# Patient Record
Sex: FEMALE | Race: WHITE | Employment: UNEMPLOYED | URBAN - METROPOLITAN AREA
[De-identification: names, ages, dates, MRNs, and addresses within clinical notes are randomized per-mention and may not be internally consistent; named-entity substitution may affect disease eponyms.]

---

## 2017-08-16 ENCOUNTER — HOSPITAL ENCOUNTER (OUTPATIENT)
Dept: MAMMOGRAPHY | Age: 52
Discharge: HOME | End: 2017-08-16
Attending: FAMILY MEDICINE

## 2017-08-16 DIAGNOSIS — Z12.39 SCREENING FOR MALIGNANT NEOPLASM OF BREAST: ICD-10-CM

## 2017-08-24 ENCOUNTER — HOSPITAL ENCOUNTER (OUTPATIENT)
Dept: SLEEP MEDICINE | Age: 52
Discharge: HOME | End: 2017-08-24
Payer: MEDICARE

## 2017-08-24 PROCEDURE — 95810 POLYSOM 6/> YRS 4/> PARAM: CPT

## 2018-08-15 ENCOUNTER — HOSPITAL ENCOUNTER (OUTPATIENT)
Dept: MAMMOGRAPHY | Age: 53
Discharge: HOME | End: 2018-08-15
Attending: FAMILY MEDICINE
Payer: MEDICARE

## 2018-08-15 DIAGNOSIS — Z12.39 ENCOUNTER FOR SCREENING FOR MALIGNANT NEOPLASM OF BREAST: ICD-10-CM

## 2018-08-15 PROCEDURE — 77067 SCR MAMMO BI INCL CAD: CPT

## 2021-06-02 ENCOUNTER — ANESTHESIA EVENT (OUTPATIENT)
Dept: SURGERY | Age: 56
End: 2021-06-02
Payer: MEDICARE

## 2021-06-03 ENCOUNTER — ANESTHESIA (OUTPATIENT)
Dept: SURGERY | Age: 56
End: 2021-06-03
Payer: MEDICARE

## 2021-06-03 ENCOUNTER — HOSPITAL ENCOUNTER (OUTPATIENT)
Age: 56
Setting detail: OUTPATIENT SURGERY
Discharge: HOME | End: 2021-06-03
Attending: ORTHOPAEDIC SURGERY | Admitting: ORTHOPAEDIC SURGERY
Payer: MEDICARE

## 2021-06-03 ENCOUNTER — APPOINTMENT (OUTPATIENT)
Dept: GENERAL RADIOLOGY | Age: 56
End: 2021-06-03
Attending: ORTHOPAEDIC SURGERY
Payer: MEDICARE

## 2021-06-03 VITALS
HEART RATE: 72 BPM | WEIGHT: 155 LBS | SYSTOLIC BLOOD PRESSURE: 111 MMHG | OXYGEN SATURATION: 95 % | DIASTOLIC BLOOD PRESSURE: 58 MMHG | TEMPERATURE: 98.2 F | BODY MASS INDEX: 26.61 KG/M2 | RESPIRATION RATE: 16 BRPM

## 2021-06-03 DIAGNOSIS — G89.18 ACUTE POST-OPERATIVE PAIN: ICD-10-CM

## 2021-06-03 DIAGNOSIS — M21.611 BILATERAL BUNIONS: Primary | ICD-10-CM

## 2021-06-03 DIAGNOSIS — M21.612 BILATERAL BUNIONS: Primary | ICD-10-CM

## 2021-06-03 LAB — POTASSIUM BLD-SCNC: 3.6 MMOL/L (ref 3.5–5.1)

## 2021-06-03 PROCEDURE — 28308 INCISION OF METATARSAL: CPT | Performed by: NURSE PRACTITIONER

## 2021-06-03 PROCEDURE — 76010000149 HC OR TIME 1 TO 1.5 HR: Performed by: ORTHOPAEDIC SURGERY

## 2021-06-03 PROCEDURE — 77030002933 HC SUT MCRYL J&J -A: Performed by: ORTHOPAEDIC SURGERY

## 2021-06-03 PROCEDURE — 76210000020 HC REC RM PH II FIRST 0.5 HR: Performed by: ORTHOPAEDIC SURGERY

## 2021-06-03 PROCEDURE — 74011250636 HC RX REV CODE- 250/636: Performed by: ANESTHESIOLOGY

## 2021-06-03 PROCEDURE — C1713 ANCHOR/SCREW BN/BN,TIS/BN: HCPCS | Performed by: ORTHOPAEDIC SURGERY

## 2021-06-03 PROCEDURE — 77030040573 HC BUR PROSTEP MICA WRGH -C: Performed by: ORTHOPAEDIC SURGERY

## 2021-06-03 PROCEDURE — 77030019908 HC STETH ESOPH SIMS -A: Performed by: NURSE ANESTHETIST, CERTIFIED REGISTERED

## 2021-06-03 PROCEDURE — 84132 ASSAY OF SERUM POTASSIUM: CPT

## 2021-06-03 PROCEDURE — 28299 COR HLX VLGS DOUBLE OSTEOT: CPT | Performed by: NURSE PRACTITIONER

## 2021-06-03 PROCEDURE — 74011250637 HC RX REV CODE- 250/637: Performed by: ANESTHESIOLOGY

## 2021-06-03 PROCEDURE — 77030040571: Performed by: ORTHOPAEDIC SURGERY

## 2021-06-03 PROCEDURE — 2709999900 HC NON-CHARGEABLE SUPPLY: Performed by: ORTHOPAEDIC SURGERY

## 2021-06-03 PROCEDURE — 77030008845 HC WRE K STRY -B: Performed by: ORTHOPAEDIC SURGERY

## 2021-06-03 PROCEDURE — 77030040572 HC DRVR HEX PROSTEP MICA WRGH -C: Performed by: ORTHOPAEDIC SURGERY

## 2021-06-03 PROCEDURE — 76060000033 HC ANESTHESIA 1 TO 1.5 HR: Performed by: ORTHOPAEDIC SURGERY

## 2021-06-03 PROCEDURE — 77030010509 HC AIRWY LMA MSK TELE -A: Performed by: NURSE ANESTHETIST, CERTIFIED REGISTERED

## 2021-06-03 PROCEDURE — 77030002982 HC SUT POLYSRB J&J -A: Performed by: ORTHOPAEDIC SURGERY

## 2021-06-03 PROCEDURE — 77030000032 HC CUF TRNQT ZIMM -B: Performed by: ORTHOPAEDIC SURGERY

## 2021-06-03 PROCEDURE — 74011250636 HC RX REV CODE- 250/636: Performed by: NURSE ANESTHETIST, CERTIFIED REGISTERED

## 2021-06-03 PROCEDURE — 77030006788 HC BLD SAW OSC STRY -B: Performed by: ORTHOPAEDIC SURGERY

## 2021-06-03 PROCEDURE — 74011250636 HC RX REV CODE- 250/636: Performed by: NURSE PRACTITIONER

## 2021-06-03 PROCEDURE — 74011000250 HC RX REV CODE- 250: Performed by: NURSE ANESTHETIST, CERTIFIED REGISTERED

## 2021-06-03 PROCEDURE — 74011000250 HC RX REV CODE- 250: Performed by: ORTHOPAEDIC SURGERY

## 2021-06-03 PROCEDURE — 28299 COR HLX VLGS DOUBLE OSTEOT: CPT | Performed by: ORTHOPAEDIC SURGERY

## 2021-06-03 PROCEDURE — 28285 REPAIR OF HAMMERTOE: CPT | Performed by: ORTHOPAEDIC SURGERY

## 2021-06-03 PROCEDURE — 77030002916 HC SUT ETHLN J&J -A: Performed by: ORTHOPAEDIC SURGERY

## 2021-06-03 PROCEDURE — 76210000001 HC OR PH I REC 2.5 TO 3 HR: Performed by: ORTHOPAEDIC SURGERY

## 2021-06-03 PROCEDURE — 28308 INCISION OF METATARSAL: CPT | Performed by: ORTHOPAEDIC SURGERY

## 2021-06-03 PROCEDURE — 77030008825 HC WRE FIX K ZIMM -B: Performed by: ORTHOPAEDIC SURGERY

## 2021-06-03 DEVICE — IMPLANTABLE DEVICE
Type: IMPLANTABLE DEVICE | Site: FOOT | Status: FUNCTIONAL
Brand: FUSEFORCE

## 2021-06-03 DEVICE — SNAP-OFF SCREW
Type: IMPLANTABLE DEVICE | Site: FOOT | Status: FUNCTIONAL
Brand: CHARLOTTE

## 2021-06-03 DEVICE — WIRE ORTH 1.1MM DIA 229MM SMOOTH DBL BAYNT TIP S STL K: Type: IMPLANTABLE DEVICE | Site: FOOT | Status: FUNCTIONAL

## 2021-06-03 DEVICE — SCREW BNE L40MM DIA4MM PROSTEP MICA: Type: IMPLANTABLE DEVICE | Site: FOOT | Status: FUNCTIONAL

## 2021-06-03 DEVICE — HV SCREW
Type: IMPLANTABLE DEVICE | Site: FOOT | Status: FUNCTIONAL
Brand: PROSTEP

## 2021-06-03 DEVICE — JONES K-WIRE
Type: IMPLANTABLE DEVICE | Site: FOOT | Status: FUNCTIONAL
Brand: CHARLOTTE

## 2021-06-03 DEVICE — HEADLESS SCREW
Type: IMPLANTABLE DEVICE | Site: FOOT | Status: FUNCTIONAL
Brand: MINI

## 2021-06-03 DEVICE — SCREW BNE L50MM DIA4MM TI ALLY CANN FULL THRD ST HDLSS: Type: IMPLANTABLE DEVICE | Site: FOOT | Status: FUNCTIONAL

## 2021-06-03 RX ORDER — SODIUM CHLORIDE 0.9 % (FLUSH) 0.9 %
5-40 SYRINGE (ML) INJECTION EVERY 8 HOURS
Status: DISCONTINUED | OUTPATIENT
Start: 2021-06-03 | End: 2021-06-03 | Stop reason: HOSPADM

## 2021-06-03 RX ORDER — CEPHALEXIN 500 MG/1
500 CAPSULE ORAL 4 TIMES DAILY
Qty: 12 CAPSULE | Refills: 0 | Status: SHIPPED
Start: 2021-06-03 | End: 2021-09-23

## 2021-06-03 RX ORDER — MIDAZOLAM HYDROCHLORIDE 1 MG/ML
2 INJECTION, SOLUTION INTRAMUSCULAR; INTRAVENOUS
Status: COMPLETED | OUTPATIENT
Start: 2021-06-03 | End: 2021-06-03

## 2021-06-03 RX ORDER — CEFAZOLIN SODIUM/WATER 2 G/20 ML
2 SYRINGE (ML) INTRAVENOUS ONCE
Status: COMPLETED | OUTPATIENT
Start: 2021-06-03 | End: 2021-06-03

## 2021-06-03 RX ORDER — ACETAMINOPHEN 500 MG
1000 TABLET ORAL ONCE
Status: COMPLETED | OUTPATIENT
Start: 2021-06-03 | End: 2021-06-03

## 2021-06-03 RX ORDER — FENTANYL CITRATE 50 UG/ML
INJECTION, SOLUTION INTRAMUSCULAR; INTRAVENOUS AS NEEDED
Status: DISCONTINUED | OUTPATIENT
Start: 2021-06-03 | End: 2021-06-03 | Stop reason: HOSPADM

## 2021-06-03 RX ORDER — FENTANYL CITRATE 50 UG/ML
100 INJECTION, SOLUTION INTRAMUSCULAR; INTRAVENOUS ONCE
Status: DISCONTINUED | OUTPATIENT
Start: 2021-06-03 | End: 2021-06-03 | Stop reason: HOSPADM

## 2021-06-03 RX ORDER — KETOROLAC TROMETHAMINE 30 MG/ML
INJECTION, SOLUTION INTRAMUSCULAR; INTRAVENOUS AS NEEDED
Status: DISCONTINUED | OUTPATIENT
Start: 2021-06-03 | End: 2021-06-03 | Stop reason: HOSPADM

## 2021-06-03 RX ORDER — LIDOCAINE HYDROCHLORIDE 20 MG/ML
INJECTION, SOLUTION EPIDURAL; INFILTRATION; INTRACAUDAL; PERINEURAL AS NEEDED
Status: DISCONTINUED | OUTPATIENT
Start: 2021-06-03 | End: 2021-06-03 | Stop reason: HOSPADM

## 2021-06-03 RX ORDER — BUPIVACAINE HYDROCHLORIDE 5 MG/ML
INJECTION, SOLUTION EPIDURAL; INTRACAUDAL AS NEEDED
Status: DISCONTINUED | OUTPATIENT
Start: 2021-06-03 | End: 2021-06-03 | Stop reason: HOSPADM

## 2021-06-03 RX ORDER — SODIUM CHLORIDE, SODIUM LACTATE, POTASSIUM CHLORIDE, CALCIUM CHLORIDE 600; 310; 30; 20 MG/100ML; MG/100ML; MG/100ML; MG/100ML
75 INJECTION, SOLUTION INTRAVENOUS CONTINUOUS
Status: DISCONTINUED | OUTPATIENT
Start: 2021-06-03 | End: 2021-06-03 | Stop reason: HOSPADM

## 2021-06-03 RX ORDER — HYDROMORPHONE HYDROCHLORIDE 1 MG/ML
0.5 INJECTION, SOLUTION INTRAMUSCULAR; INTRAVENOUS; SUBCUTANEOUS
Status: COMPLETED | OUTPATIENT
Start: 2021-06-03 | End: 2021-06-03

## 2021-06-03 RX ORDER — ONDANSETRON 2 MG/ML
INJECTION INTRAMUSCULAR; INTRAVENOUS AS NEEDED
Status: DISCONTINUED | OUTPATIENT
Start: 2021-06-03 | End: 2021-06-03 | Stop reason: HOSPADM

## 2021-06-03 RX ORDER — ALBUTEROL SULFATE 0.83 MG/ML
2.5 SOLUTION RESPIRATORY (INHALATION) AS NEEDED
Status: DISCONTINUED | OUTPATIENT
Start: 2021-06-03 | End: 2021-06-03 | Stop reason: HOSPADM

## 2021-06-03 RX ORDER — DEXAMETHASONE SODIUM PHOSPHATE 4 MG/ML
INJECTION, SOLUTION INTRA-ARTICULAR; INTRALESIONAL; INTRAMUSCULAR; INTRAVENOUS; SOFT TISSUE AS NEEDED
Status: DISCONTINUED | OUTPATIENT
Start: 2021-06-03 | End: 2021-06-03 | Stop reason: HOSPADM

## 2021-06-03 RX ORDER — OXYCODONE HYDROCHLORIDE 5 MG/1
5 TABLET ORAL
Status: DISCONTINUED | OUTPATIENT
Start: 2021-06-03 | End: 2021-06-03 | Stop reason: HOSPADM

## 2021-06-03 RX ORDER — OXYCODONE HYDROCHLORIDE 5 MG/1
5 TABLET ORAL
Qty: 30 TABLET | Refills: 0 | Status: SHIPPED | OUTPATIENT
Start: 2021-06-03 | End: 2021-06-11

## 2021-06-03 RX ORDER — LIDOCAINE HYDROCHLORIDE 10 MG/ML
0.1 INJECTION INFILTRATION; PERINEURAL AS NEEDED
Status: DISCONTINUED | OUTPATIENT
Start: 2021-06-03 | End: 2021-06-03 | Stop reason: HOSPADM

## 2021-06-03 RX ORDER — MIDAZOLAM HYDROCHLORIDE 1 MG/ML
2 INJECTION, SOLUTION INTRAMUSCULAR; INTRAVENOUS ONCE
Status: DISCONTINUED | OUTPATIENT
Start: 2021-06-03 | End: 2021-06-03 | Stop reason: HOSPADM

## 2021-06-03 RX ORDER — SODIUM CHLORIDE, SODIUM LACTATE, POTASSIUM CHLORIDE, CALCIUM CHLORIDE 600; 310; 30; 20 MG/100ML; MG/100ML; MG/100ML; MG/100ML
50 INJECTION, SOLUTION INTRAVENOUS CONTINUOUS
Status: DISCONTINUED | OUTPATIENT
Start: 2021-06-03 | End: 2021-06-03 | Stop reason: HOSPADM

## 2021-06-03 RX ORDER — SODIUM CHLORIDE 0.9 % (FLUSH) 0.9 %
5-40 SYRINGE (ML) INJECTION AS NEEDED
Status: DISCONTINUED | OUTPATIENT
Start: 2021-06-03 | End: 2021-06-03 | Stop reason: HOSPADM

## 2021-06-03 RX ORDER — PROPOFOL 10 MG/ML
INJECTION, EMULSION INTRAVENOUS AS NEEDED
Status: DISCONTINUED | OUTPATIENT
Start: 2021-06-03 | End: 2021-06-03 | Stop reason: HOSPADM

## 2021-06-03 RX ADMIN — ACETAMINOPHEN 1000 MG: 500 TABLET ORAL at 09:41

## 2021-06-03 RX ADMIN — HYDROMORPHONE HYDROCHLORIDE 0.5 MG: 1 INJECTION, SOLUTION INTRAMUSCULAR; INTRAVENOUS; SUBCUTANEOUS at 13:12

## 2021-06-03 RX ADMIN — DEXAMETHASONE SODIUM PHOSPHATE 4 MG: 4 INJECTION, SOLUTION INTRAMUSCULAR; INTRAVENOUS at 12:29

## 2021-06-03 RX ADMIN — HYDROMORPHONE HYDROCHLORIDE 0.5 MG: 1 INJECTION, SOLUTION INTRAMUSCULAR; INTRAVENOUS; SUBCUTANEOUS at 13:42

## 2021-06-03 RX ADMIN — PHENYLEPHRINE HYDROCHLORIDE 50 MCG: 10 INJECTION INTRAVENOUS at 11:28

## 2021-06-03 RX ADMIN — KETOROLAC TROMETHAMINE 30 MG: 30 INJECTION, SOLUTION INTRAMUSCULAR at 12:29

## 2021-06-03 RX ADMIN — FENTANYL CITRATE 50 MCG: 50 INJECTION INTRAMUSCULAR; INTRAVENOUS at 11:47

## 2021-06-03 RX ADMIN — PHENYLEPHRINE HYDROCHLORIDE 100 MCG: 10 INJECTION INTRAVENOUS at 11:31

## 2021-06-03 RX ADMIN — HYDROMORPHONE HYDROCHLORIDE 0.5 MG: 1 INJECTION, SOLUTION INTRAMUSCULAR; INTRAVENOUS; SUBCUTANEOUS at 13:35

## 2021-06-03 RX ADMIN — MIDAZOLAM 2 MG: 1 INJECTION INTRAMUSCULAR; INTRAVENOUS at 11:22

## 2021-06-03 RX ADMIN — FENTANYL CITRATE 25 MCG: 50 INJECTION INTRAMUSCULAR; INTRAVENOUS at 11:38

## 2021-06-03 RX ADMIN — PROPOFOL 100 MG: 10 INJECTION, EMULSION INTRAVENOUS at 11:28

## 2021-06-03 RX ADMIN — SODIUM CHLORIDE, SODIUM LACTATE, POTASSIUM CHLORIDE, AND CALCIUM CHLORIDE 75 ML/HR: 600; 310; 30; 20 INJECTION, SOLUTION INTRAVENOUS at 09:15

## 2021-06-03 RX ADMIN — SODIUM CHLORIDE, SODIUM LACTATE, POTASSIUM CHLORIDE, AND CALCIUM CHLORIDE: 600; 310; 30; 20 INJECTION, SOLUTION INTRAVENOUS at 12:15

## 2021-06-03 RX ADMIN — ONDANSETRON 4 MG: 2 INJECTION INTRAMUSCULAR; INTRAVENOUS at 12:29

## 2021-06-03 RX ADMIN — PROPOFOL 200 MG: 10 INJECTION, EMULSION INTRAVENOUS at 11:26

## 2021-06-03 RX ADMIN — LIDOCAINE HYDROCHLORIDE 100 MG: 20 INJECTION, SOLUTION EPIDURAL; INFILTRATION; INTRACAUDAL; PERINEURAL at 11:26

## 2021-06-03 RX ADMIN — CEFAZOLIN 2 G: 1 INJECTION, POWDER, FOR SOLUTION INTRAVENOUS at 11:32

## 2021-06-03 RX ADMIN — HYDROMORPHONE HYDROCHLORIDE 0.5 MG: 1 INJECTION, SOLUTION INTRAMUSCULAR; INTRAVENOUS; SUBCUTANEOUS at 13:19

## 2021-06-03 RX ADMIN — FENTANYL CITRATE 25 MCG: 50 INJECTION INTRAMUSCULAR; INTRAVENOUS at 11:54

## 2021-06-03 NOTE — OP NOTES
FULL OP NOTE    PATIENT NAME: Bao Rojas  MRN: [id number]    DATE OF SURGERY: 6/3/2021    PREOPERATIVE DIAGNOSIS: Hammer toes of both feet [M20.41, M20.42]  Valgus deformity of both great toes [M20.11, M20.12]      POSTOPERATIVE DIAGNOSIS: Hammer toes of both feet [M20.41, M20.42]  Valgus deformity of both great toes [M20.11, M20.12]      PROCEDURE: 1. Left minimal invasive double level bunionectomy, 14873                            2.  Left second third fourth and fifth metatarsal Weil osteotomies, 90145E9                            3.  Right open chevron and Akin double level bunionectomy, 59594                           4.  Right second and third proximal interphalangeal joint resection arthroplasties, 09513R1                            5.  Right second and third open distal metatarsal Weil osteotomies, 28308x2    SURGEON: Josh Bautista MD    ASSISTANT: Tylor Arcos NP  An assistant was required for positioning, retraction, and wound closure for this procedure. HARDWARE:   Implant Name Type Inv.  Item Serial No.  Lot No. LRB No. Used Action   WIRE ORTH 1.1MM MARTHA 229MM SMOOTH DBL BAYNT Brandy Lolling - [id number]  WIRE ORTH 1.1MM MARTHA 229MM SMOOTH DBL BAYNT TIP S STL K  KHAL[employer] [facility]_ [id number] N/A 2 Implanted   SCREW BNE L50MM DIA4MM TI ALLY [facility] FULL THRD [facility] - [id number]  SCREW BNE L50MM [facility]MM [facility] INCRed [facility] [id number] Left 1 Implanted   SCREW BNE L40MM DIA4MM PROSTEP DARRYL - [id number]  SCREW BNE L40MM DIA4MM PROSTEP DARRYL  [facility] [employer] INCRed [facility] [id number] Left 1 Implanted   SCREW BNE L20MM DIA3MM HV FOR AKIN OSTEOTMY PROSTEP - [id number]  SCREW BNE L20MM DIA3MM HV FOR SANTHOSH OSTEOTMY [facility] INCRed [facility] [id number] Left 1 Implanted   STAPLE BNE FIX [id number] NIT - [id number]  STAPLE BNE FIX [id number] NIT  [facility] [employer] INC_WD [id number] Right 1 Implanted   K WIRE FIX L228MM DIA2MM S STL SMOOTH DBL END DBL SHRP TIP - [id number]  K WIRE FIX L228MM DIA2MM S STL SMOOTH DBL END DBL SHRP TIP  [employer] INC_WD [id number] Right 1 Implanted   SCREW BNE L24MM DIA3MM MINI TI J CARLOS HDLSS - [id number]  SCREW BNE L24MM DIA3MM MINI TI J CARLOS HDLSS  [employer]_WD [id number] Right 1 Implanted   SCREW BNE L11MM DIA2MM ELENA TI ALLY ST SELF DRL J CARLOS - [id number]  SCREW BNE L11MM DIA2MM ELENA TI ALLY ST SELF DRL J CARLOS  [employer]_WD [id number] Right 1 Implanted     INDICATIONS: This patient is a 61y.o. year old female with a history of Hammer toes of both feet [M20.41, M20.42]  Valgus deformity of both great toes [M20.11, M20.12] who has failed conservative therapy and desires surgical treatment. Risks and benefits of the procedure including, but not limited to, anesthetic complications as well as surgical complications including damage to nerves and blood vessels, risk of infection, risk of incomplete pain relief, risk of malunion, nonunion and need for additional surgery have been discussed with the patient who wishes to proceed. PROCEDURE IN DETAIL: A time out was done to confirm the operating procedure, surgeon, patient and site. During a preop surgical timeout the correct operative sites were identified and prepped and draped in a standard sterile fashions and ChloraPrep solution. Attention was first turned to the left foot. A small incision was made medially on the foot at the first TMT joint level. A guidepin for the minimal invasive screw system was then placed inside intramedullary canal the first metatarsal.  A separate small incision was then made distally where the [facility] bur was introduced and a chevron osteotomy was performed with the capital fragment shifted approximately 70 to 80% laterally and secured using a guidewire. 2 lag screws were placed over the guidewire with good purchase noted.   An Reginald osteotomy was performed to the proximal phalanx and secured using a separate headless screw under fluoroscopic guidance. Weil osteotomies of the second third fourth and fifth metatarsals were performed using a [employer] bur through separate percutaneous incisions at that time as well. The wounds were all irrigated and closed using Monocryl and nylon sutures. A sterile dressing was then applied. Attention was then turned to the right foot. A medial approach the first MTP joint was opened at that time followed by a longitudinal capsulotomy. The medial eminence was resected using an oscillating saw. A chevron osteotomy was performed the distal metatarsal and shifted approximately 4 mm laterally and secured using a Toppr headless screw on image guidance. An Reginald osteotomy was performed the proximal phalanx and secured using a compression staple. PIP resection arthroplasty of the right second and third toes were then performed elliptical incisions. A separate dorsal approach the second and third metatarsal phalangeal joints was open with capsulotomies. Open Weil osteotomy was performed using a sagittal saw of both metatarsals and secured using [facility] twist off screws. Both lesser toes on the right were then pinned using a K wire in a retrograde fashion and confirmed to be adequately placed on image intensification. The wounds on the right were then irrigated and closed using Vicryl in the capsule followed by Monocryl and nylon sutures on the skin. A sterile dressing was then applied. Anesthesia was discontinued. The patient was transferred back to recovery bed. She was taken to recovery in satisfactory condition. She appeared to tolerate the procedure well. There were no apparent surgical or anesthetic complications. All needle and sponge counts are correct. TOURNIQUET TIME: Approx 42 left and 30 right minutes. SPECIMENS: none    ESTIMATED BLOOD LOSS: min mL.

## 2021-06-03 NOTE — PERIOP NOTES
Son at bedside. Discharge instructions reviewed with patient and her son.  They voice understanding at this time with no questions or concerns

## 2021-06-03 NOTE — BRIEF OP NOTE
Brief Postoperative Note    Patient: Jeanine Pastor  YOB: 1965  MRN: [id number]    Date of Procedure: 6/3/2021     Pre-Op Diagnosis: Hammer toes of both feet [M20.41, M20.42]  Valgus deformity of both great toes [M20.11, M20.12]    Post-Op Diagnosis: Same as preoperative diagnosis. Procedure(s):  RIGHT CHEVRON/ AKIN BUNIONECTOMY  RIGHT 2ND AND 3RD PROXIMAL INTERPHALANGEAL RESECTION ARTHROPLASTIES AND METATARSOPHALANGEAL WEIL OSTEOTOMIES AND LEFT MIS BUNION AND 2,3,4,5 MIS WEIL OSTEOTOMIES    Surgeon(s):  Gisela Ramirez MD    Surgical Assistant: Nurse Practitioner: Cristofer Grier RN    Anesthesia: General     Estimated Blood Loss (mL): Minimal    Complications: None    Specimens: * No specimens in log *     Implants:   Implant Name Type Inv.  Item Serial No.  Lot No. LRB No. Used Action   WIRE ORTH 1.1MM MARTHA 229MM SMOOTH DBL BAYNT [employer] - [id number]  WIRE ORTH 1.1MM MARTHA 229MM SMOOTH DBL BAYNT TIP S STL K  [employer]_ [id number] N/A 2 Implanted   SCREW BNE L50MM DIA4MM TI ALLY J CARLOS FULL THRD [facility] - [id number]  SCREW BNE L50MM DIA4MM TI ALLY J CARLOS FULL THRD ST [facility]_ [id number] Left 1 Implanted   SCREW BNE L40MM DIA4MM PROSTEP DARRYL - [id number]  SCREW BNE L40MM DIA4MM PROSTEP DARRYL  [facility] [employer] INC_ [id number] Left 1 Implanted   SCREW BNE L20MM DIA3MM HV FOR AKIN OSTEOTMY PROSTEP - [id number]  SCREW BNE L20MM DIA3MM HV FOR SANTHOSH OSTEOTMY [facility] [employer] INC_ [id number] Left 1 Implanted   STAPLE BNE FIX [id number] NIT - [id number]  STAPLE BNE FIX [id number] NIT  [facility] [employer] INC_ [id number] Right 1 Implanted   K WIRE FIX L228MM DIA2MM S STL SMOOTH DBL END DBL SHRP TIP - [id number]  K WIRE FIX L228MM DIA2MM S STL SMOOTH DBL END DBL SHRP TIP  [employer]_ [id number] Right 1 Implanted   SCREW BNE L24MM DIA3MM MINI TI J CARLOS [facility] - [id number]  SCREW BNE L24MM DIA3MM MINI TI J CARLOS HDLSS  [employer]_WD [id number] Right 1 Implanted   SCREW BNE L11MM DIA2MM ELENA TI ALLY ST SELF DRL [facility] [id number]  SCREW BNE L11MM DIA2MM ELENA TI ALLY ST SELF DRL [facility] [employer] INC_WD [id number] Right 1 Implanted       Drains: * No LDAs found *    Findings:     Electronically Signed by Sherie Carrion MD on 6/3/2021 at 7:40 PM

## 2021-06-03 NOTE — H&P
Outpatient Surgery History and Physical:  Jeanine Pastor was seen and examined. CHIEF COMPLAINT:   Right foot. PE:     Visit Vitals  BP (!) 105/52 (BP 1 Location: Right arm, BP Patient Position: Sitting)   Pulse 70   Temp 97.9 °F (36.6 °C)   Resp 18   Wt 155 lb (70.3 kg)   SpO2 95%   BMI 26.61 kg/m²       Heart:   Regular rhythm      Lungs:  Are clear      Past Medical History:    Patient Active Problem List    Diagnosis    Chronic hepatitis C without hepatic coma ([facility] [address]     she completed treatment with Sovaldi/RBV x12 weeks around 08/10/16, was HCV RNA undetectable at EOT on 08/15/16. She was an F0-F1 on Fibrosure.  Restless leg    Osteoporosis    Anxiety and depression    Chronic pain    Hyperlipidemia    Arthritis    Back pain    Insomnia    GERD (gastroesophageal reflux disease)    Nausea    Obesity    Carpal tunnel syndrome    Serotonin syndrome       Surgical History:   Past Surgical History:   Procedure Laterality Date    HX CARPAL TUNNEL RELEASE Bilateral     HX CYST REMOVAL      mouth    HX OTHER SURGICAL  11/6/98    Liver biopsy, chronic hepatitis with minimal periportal inflammation and minimal portal fibrosis     HX TONSIL AND ADENOIDECTOMY      HX TUBAL LIGATION         Social History: Patient  reports that she quit smoking about 9 years ago. She has a 25.00 pack-year smoking history. She has never used smokeless tobacco. She reports that she does not drink alcohol and does not use drugs.     Family History:   Family History   Problem Relation Age of Onset    Heart Disease Father     Lung Disease Father     Other Father         Bypass surgery, heart rhythm abnormality getting defibrillator     Cancer Maternal Grandmother         sinus    Cancer Maternal Grandfather         Sinus    Diabetes Paternal Grandmother     Cancer Paternal Grandmother         Sinus    Cancer Paternal Grandfather         Sinus    Diabetes Son        Allergies: Reviewed per EMR  Allergies Allergen Reactions    Ambien [Zolpidem] Other (comments)     Nighttime eating with no memory      Chantix [Varenicline] Other (comments)     \"make me crazy\"/homicidal-took it a couple of weeks    Other Medication Other (comments)     Most antidepressants- SSRIs- serotonin syndrome.  Prolia [Denosumab] Other (comments)     She had jaw pain, generalized symptomatology a fairly severe reaction.  Tegretol [Carbamazepine] Other (comments)     Rash generalized and itching      Trazodone Other (comments)     Serotonin syndrome       Medications:    No current facility-administered medications on file prior to encounter. Current Outpatient Medications on File Prior to Encounter   Medication Sig    buPROPion SR (Wellbutrin SR) 150 mg SR tablet nightly.  rOPINIRole (Requip) 1 mg tablet 1-2 at night    HYDROcodone-acetaminophen (NORCO)  mg tablet Take 2 Tabs by mouth two (2) times a day. Max Daily Amount: 4 Tabs.  clonazePAM (KLONOPIN) 2 mg tablet Take 1 Tab by mouth two (2) times a day. Max Daily Amount: 4 mg.  suvorexant (BELSOMRA) 20 mg tablet Take 1 Tab by mouth nightly as needed for Insomnia. Max Daily Amount: 20 mg.    omeprazole (PRILOSEC) 20 mg capsule Take 1 Cap by mouth every morning.  raloxifene (EVISTA) 60 mg tablet Take 1 Tab by mouth daily.  ibuprofen (MOTRIN) 200 mg tablet Take  by mouth every six (6) hours as needed.  calcium-cholecalciferol, d3, (CALCIUM 600 + D) 600-125 mg-unit tab Take  by mouth daily.  magnesium oxide 500 mg tab Take 500 mg by mouth nightly. Or 250 twice daily. The surgery is planned for the Right chevron and Akin bunionectomy, right second and third PIP resection arthroplasties and MTP Weil osteotomies and left minimal invasive bunion and 2,3,4,5 minimal invasive Weil osteotomies. History and physical has been reviewed. The patient has been examined.  There have been no significant clinical changes since the completion of the originally dated History and Physical.  Patient identified by surgeon; surgical site was confirmed by patient and surgeon. The patient is here today for outpatient surgery. I have examined the patient, no changes are noted in the patient's medical status. Necessity for the procedure/care is still present and the history and physical above is current. See the office notes for the full long term history of the problem. Please see the recent office notes for the musculoskeletal examination.     Signed By: Noah Nevarez NP     Aida 3, 2021 8:44 AM

## 2021-06-03 NOTE — ANESTHESIA PREPROCEDURE EVALUATION
Anesthetic History   No history of anesthetic complications            Review of Systems / Medical History  Patient summary reviewed    Pulmonary  Within defined limits                 Neuro/Psych         Psychiatric history     Cardiovascular                  Exercise tolerance: >4 METS     GI/Hepatic/Renal     GERD: well controlled      Liver disease     Endo/Other        Arthritis     Other Findings   Comments: Hx serotonin syndrome    Chronic back pain on chronic opioids           Physical Exam    Airway  Mallampati: II  TM Distance: > 6 cm  Neck ROM: normal range of motion   Mouth opening: Normal     Cardiovascular  Regular rate and rhythm,  S1 and S2 normal,  no murmur, click, rub, or gallop             Dental    Dentition: Upper partial plate and Lower dentition intact     Pulmonary  Breath sounds clear to auscultation               Abdominal         Other Findings            Anesthetic Plan    ASA: 2  Anesthesia type: general          Induction: Intravenous  Anesthetic plan and risks discussed with: Patient and Son / Daughter

## 2021-06-03 NOTE — ANESTHESIA POSTPROCEDURE EVALUATION
Procedure(s):  RIGHT CHEVRON/ AKIN BUNIONECTOMY  RIGHT 2ND AND 3RD PROXIMAL INTERPHALANGEAL RESECTION ARTHROPLASTIES AND METATARSOPHALANGEAL WEIL OSTEOTOMIES AND LEFT MIS BUNION AND 2,3,4,5 MIS WEIL OSTEOTOMIES. general    Anesthesia Post Evaluation      Multimodal analgesia: multimodal analgesia used between 6 hours prior to anesthesia start to PACU discharge  Patient location during evaluation: PACU  Patient participation: complete - patient participated  Level of consciousness: responsive to verbal stimuli and sleepy but conscious (was somnolent at baseline this morning)  Pain management: adequate  Airway patency: patent  Anesthetic complications: no  Cardiovascular status: acceptable  Respiratory status: acceptable, spontaneous ventilation and nonlabored ventilation  Hydration status: acceptable  Post anesthesia nausea and vomiting:  none      INITIAL Post-op Vital signs:   Vitals Value Taken Time   /62 06/03/21 1342   Temp 37 °C (98.6 °F) 06/03/21 1252   Pulse 64 06/03/21 1346   Resp 15 06/03/21 1322   SpO2 100 % 06/03/21 1346   Vitals shown include unvalidated device data.

## 2021-06-03 NOTE — DISCHARGE INSTRUCTIONS
INSTRUCTIONS FOLLOWING FOOT SURGERY    ACTIVITY  Elevate foot. No Ice       Protected partial weight bearing on the heel only as tolerated in post op shoe after full sensation returns. Let the office know if dressing gets saturated with water . Blood clot prevention:  As instructed by Dr Thomas Laura: Take one 325mg aspirin daily if okay with your medical doctor and you have no GI ulcer. Get up and out of bed frequently. While in bed move the legs as much as possible)    DRESSING CARE Keep dry and in place until follow up appointment. Cover with cast bag or plastic bag when showering. CALL YOUR DOCTOR IF YOU HAVE  Excessive bleeding that does not stop after holding mild pressure over the area. Temperature of 101 degrees or above. Redness, excessive swelling or bruising, and/or green or yellow, smelly discharge from incision. Loss of sensation - cold, white or blue toes. DIET  Day of Surgery: Clear liquids until no nausea or vomiting; then light, bland diet (Baked chicken, plain rice, grits, scrambled egg, toast). Nothing Greasy, fried or spicy today  Advance to regular diet on second day, unless your doctor orders otherwise. PAIN  Take pain medications as directed by your doctor. Call your doctor if pain is NOT relieved by medication. PAIN MED SIDE EFFECTS  Constipation: Lots of fluids, try prune juice, then OTC stool softeners if necessary  Nausea: Take medication with food. MEDICATION INTERACTION:  During your procedure you potentially received a medication or medications which may reduce the effectiveness of oral contraceptives. Please consider other forms of contraception for 1 month following your procedure if you are currently using oral contraceptives as your primary form of birth control.  In addition to this, we recommend continuing your oral contraceptive as prescribed, unless otherwise instructed by your physician, during this time    After general anesthesia or intravenous sedation, for 24 hours or while taking prescription Narcotics:  · Limit your activities  · A responsible adult needs to be with you for the next 24 hours  · Do not drive and operate hazardous machinery  · Do not make important personal or business decisions  · Do not drink alcoholic beverages  · If you have not urinated within 8 hours after discharge, and you are experiencing discomfort from urinary retention, please go to the nearest ED. · If you have sleep apnea and have a CPAP machine, please use it for all naps and sleeping. · Please use caution when taking narcotics and any of your home medications that may cause drowsiness. *  Please give a list of your current medications to your Primary Care Provider. *  Please update this list whenever your medications are discontinued, doses are      changed, or new medications (including over-the-counter products) are added. *  Please carry medication information at all times in case of emergency situations. These are general instructions for a healthy lifestyle:  No smoking/ No tobacco products/ Avoid exposure to second hand smoke  Surgeon General's Warning:  Quitting smoking now greatly reduces serious risk to your health. Obesity, smoking, and sedentary lifestyle greatly increases your risk for illness  A healthy diet, regular physical exercise & weight monitoring are important for maintaining a healthy lifestyle    You may be retaining fluid if you have a history of heart failure or if you experience any of the following symptoms:  Weight gain of 3 pounds or more overnight or 5 pounds in a week, increased swelling in our hands or feet or shortness of breath while lying flat in bed. Please call your doctor as soon as you notice any of these symptoms; do not wait until your next office visit.

## 2021-08-06 ENCOUNTER — HOSPITAL ENCOUNTER (OUTPATIENT)
Dept: PHYSICAL THERAPY | Age: 56
Discharge: HOME | End: 2021-08-06
Payer: MEDICARE

## 2021-08-06 DIAGNOSIS — M20.41 HAMMER TOES OF BOTH FEET: ICD-10-CM

## 2021-08-06 DIAGNOSIS — M79.671 PAIN IN BOTH FEET: ICD-10-CM

## 2021-08-06 DIAGNOSIS — M20.42 HAMMER TOES OF BOTH FEET: ICD-10-CM

## 2021-08-06 DIAGNOSIS — M79.671 BILATERAL FOOT PAIN: ICD-10-CM

## 2021-08-06 DIAGNOSIS — M79.672 PAIN IN BOTH FEET: ICD-10-CM

## 2021-08-06 DIAGNOSIS — M79.672 BILATERAL FOOT PAIN: ICD-10-CM

## 2021-08-06 DIAGNOSIS — M20.11 VALGUS DEFORMITY OF BOTH GREAT TOES: ICD-10-CM

## 2021-08-06 DIAGNOSIS — M20.12 VALGUS DEFORMITY OF BOTH GREAT TOES: ICD-10-CM

## 2021-08-06 PROCEDURE — 97161 PT EVAL LOW COMPLEX 20 MIN: CPT

## 2021-08-06 PROCEDURE — 97140 MANUAL THERAPY 1/> REGIONS: CPT

## 2021-08-06 PROCEDURE — 97110 THERAPEUTIC EXERCISES: CPT

## 2021-08-06 NOTE — PROGRESS NOTES
Miltonej Raya  : 1965  Primary: [employer] [place] Medicare Hmo  Secondary:  [address] at [facility]  [address] [place], [address] [place] [address], [address]  [phone]   [phone]      OUTPATIENT PHYSICAL THERAPY: Daily Treatment Note 2021    ICD-10: Treatment Diagnosis: Right foot pain [M79.671]                Treatment Diagnosis 2: Left foot pain [M79.672]                Treatment Diagnosis 3: Difficulty walking, not elsewhere classified [R26.2]  Precautions: None. Allergies: Ambien [zolpidem], Chantix [varenicline], Other medication, Prolia [denosumab], Tegretol [carbamazepine], and Trazodone   TREATMENT PLAN:  Effective Dates: 2021 TO 2021. Frequency/Duration: 1-2 times a week for 6 weeks. MEDICAL/REFERRING DIAGNOSIS:  Bilateral foot pain [M79.671, M79.672]  Valgus deformity of both great toes [M20.11, M20.12]  Hammer toes of both feet [M20.41, M20.42]  Pain in both feet [M79.671, M79.672]   DATE OF ONSET: s/p 2nd/3rd IP joint fusion and bunionectomy 6/3/2021. REFERRING PHYSICIAN: Mali Sigala, *  MD Orders: Evaluate and Treat  Return MD Appointment: 9/15/2021     Pre-treatment Symptoms/Complaints:  Pt complains of severe bilateral foot pain. Pain: Initial: Pain Intensity 1: 7  Pain Location 1: Foot  Pain Orientation 1: Right, Left  Post Session:  5/10   Medications Last Reviewed:  2021  Updated Objective Findings:  See evaluation note from today   TREATMENT:   THERAPEUTIC EXERCISE: (23 minutes):  Exercises per grid below to improve mobility, strength, balance and coordination. Required moderate visual, verbal, manual and tactile cues to promote proper body alignment, promote proper body posture and promote proper body mechanics. Progressed resistance, range, repetitions and complexity of movement as indicated.      Date:  2021 Date:   Date:     Activity/Exercise Parameters Parameters Parameters   Ankle pumps x15     Ankle inversion/eversion x15     Ankle circles x15     Ankle ABCs x1     Gastroc towel stretch -->                         Time spent with patient reviewing proper muscle recruitment and technique with exercises. MANUAL THERAPY: (15 minutes): Joint mobilization, Soft tissue mobilization and Manual stretching, PROM was utilized and necessary because of the patient's restricted joint motion, painful spasm, loss of articular motion and restricted motion of soft tissue   PROM: foot/ankle all planes pain free (no toe PROM)   Joint mobs: talocrual AP grade II.  Soft tissue mobilization: retrograde bilateral feet. MODALITIES: (0 minutes):      None today. HEP: As above; handouts given to patient for all exercises. Treatment/Session Summary:    · Response to Treatment:  Fair tolerance to manual and therex interventions. .  · Communication/Consultation:  HEP handout provided. · Equipment provided today:  None today  · Recommendations/Intent for next treatment session: Next visit will focus on pain reduction, improve bilateral foot/ankle AROM and strength, gait/balance training. Total Treatment Billable Duration:  58 minutes: 20 evaluation, 23 therex, 15 manual therapy.   PT Patient Time In/Time Out  Time In: 0804  Time Out: 0902  Omaira Rodriguez PT

## 2021-08-06 NOTE — THERAPY EVALUATION
Andres Barrios  : 1965  Primary: [facility] [employer]  Secondary:  [address] at [facility]  [address] [facility], [address] [place] [address], John lopez, [address] [place] [address] Street  [phone]   [phone]       OUTPATIENT PHYSICAL THERAPY:Initial Assessment 2021    ICD-10: Treatment Diagnosis: Right foot pain [M79.671]                Treatment Diagnosis 2: Left foot pain [M79.672]                Treatment Diagnosis 3: Difficulty walking, not elsewhere classified [R26.2]  Precautions: None. Allergies: Ambien [zolpidem], Chantix [varenicline], Other medication, Prolia [denosumab], Tegretol [carbamazepine], and Trazodone   TREATMENT PLAN:  Effective Dates: 2021 TO 2021. Frequency/Duration: 1-2 times a week for 6 weeks. MEDICAL/REFERRING DIAGNOSIS:  Bilateral foot pain [M79.671, M79.672]  Valgus deformity of both great toes [M20.11, M20.12]  Hammer toes of both feet [M20.41, M20.42]  Pain in both feet [M79.671, M79.672]   DATE OF ONSET: s/p 2nd/3rd IP joint fusion and bunionectomy 6/3/2021. REFERRING PHYSICIAN: Rhiannon Lorenzana, *  MD Orders: Evaluate and Treat  Return MD Appointment: 9/15/2021     INITIAL ASSESSMENT:  Ms. Andrew Cochran is a 61 y.o. female presenting to physical therapy with complaints of bilateral foot pain s/p 2nd/3rd IP joint fusion and bunionectomy 6/3/2021. Pt reports has been slowly recovering since surgery however has continued to have increased pain levels especially with prolonged walking/standing. Pt reports that PT was recommended several weeks ago however she chose not to begin PT treatment until now due to MD strong recommendation and patient education regarding the need to undergo PT treatment following her specific surgery. Pt reports burning on plantar surface of left foot however denies numbness/tingling symptoms. Pt reports has difficulty walking prolonged distances greater that 200 ft; difficulty navigation uneven surfaces.  Pt will benefit from skilled PT treatment to address deficits in strength, AROM, balance and gait in order to improve quality of life and return to prior level of function. PROBLEM LIST (Impacting functional limitations):  1. Decreased Strength  2. Decreased ADL/Functional Activities  3. Decreased Transfer Abilities  4. Decreased Ambulation Ability/Technique  5. Decreased Balance  6. Increased Pain  7. Decreased Activity Tolerance  8. Increased Fatigue  9. Decreased Flexibility/Joint Mobility  10. Edema/Girth  11. Decreased Skin Integrity/Hygeine  12. Decreased [place] with Home Exercise Program INTERVENTIONS PLANNED: (Treatment may consist of any combination of the following)  1. Balance Exercise  2. Bed Mobility  3. Cryotherapy  4. Electrical Stimulation  5. Gait Training  6. Heat  7. Home Exercise Program (HEP)  8. Manual Therapy  9. Neuromuscular Re-education/Strengthening  10. Range of Motion (ROM)  11. Therapeutic Activites  12. Therapeutic Exercise/Strengthening  13. Transcutaneous Electrical Nerve Stimulation (TENS)  14. Transfer Training  15. Ultrasound (US)     GOALS: (Goals have been discussed and agreed upon with patient.)  Short-Term Functional Goals: Time Frame: 8/6/2021 to 8/27/2021  1. Patient demonstrates independence with home exercise program without verbal cueing provided by therapist.  2. Pt will reports worst pain 5/10 or less when walking 250 ft in order to progress walking tolerance. Discharge Goals: Time Frame: 8/6/2021 to 9/17/2021  1. Pt will reports worst pain 3/10 or less when walking 750 ft in order to return to community distance ambulation. 2. Pt will demonstrate bilateral ankle AROM DF: 10 dgs, Inv: 30 dgs, Ev: 15 degrees in order to assist in restoring normal gait mechanics. 3. Pt will demonstrate gross bilateral ankle/foot strength to 4+ to 5/5 in order to improve functional transfer ability. 4. FAAM score of 50/84 or greater in order to demonstrate overall functional improvement.      Outcome Measure: Tool Used: PT/OT FOOT AND ANKLE ABILITY MEASURE  Score:  Initial: 25/84 Most Recent: X (Date: -- )   Interpretation of Score: For the \"Activities of Daily Living\", there are 21 questions each scored on a 5 point scale with 0 representing \"Unable to do\" and 4 representing \"No difficulty\". The lower the score, the greater the functional disability. 84/84 represents no disability. Minimal detectable change is 5.7 points. With the addition of the 8 questions in the \"Sports Subscale,\" there are 29 questions, each scored on a 5 point scale with 0 representing \"Unable to do\" and 4 representing \"No difficulty\". The lower the score, the greater the functional disability. 116/116 represents no disability. Minimal detectable change is 12.3 points. Medical Necessity:   · Patient is expected to demonstrate progress in strength, range of motion, balance, coordination and functional technique to increase independence with community distance ambulation, housework, stair navigation. .  · Skilled intervention continues to be required due to decreased strength, decreased gait, decreased balance, decreased AROM, decreased functional activity tolernance. .  Reason for Services/Other Comments:  · Patient continues to require skilled intervention due to increasing complexity of exercise. .  Total Evaluation Duration: 20 minutes    Rehabilitation Potential For Stated Goals: Good  Regarding Leonela Levi's therapy, I certify that the treatment plan above will be carried out by a therapist or under their direction.   Thank you for this referral,  Hattie Charlton, PT   DPT  Referring Physician Signature: Camila Mabry, Michael Palm, * _______________________________ Date _____________             PAIN/SUBJECTIVE:    Initial: Pain Intensity 1: 7  Pain Location 1: Foot  Pain Orientation 1: Right, Left  Post Session:  5/10    HISTORY:    History of Injury/Illness (Reason for Referral):  Ms. Marielle Burt is a 61 y.o. female presenting to physical therapy with complaints of bilateral foot pain s/p 2nd/3rd IP joint fusion and bunionectomy 6/3/2021. Pt reports has been slowly recovering since surgery however has continued to have increased pain levels especially with prolonged walking/standing. Pt reports that PT was recommended several weeks ago however she chose not to begin PT treatment until now due to MD strong recommendation and patient education regarding the need to undergo PT treatment following her specific surgery. Pt reports burning on plantar surface of left foot however denies numbness/tingling symptoms. Pt reports has difficulty walking prolonged distances greater that 200 ft; difficulty navigation uneven surfaces. Past Medical History/Comorbidities:   Ms. Jhonathan Jimenes  has a past medical history of Allergic dermatitis, Alterations of sensations, Anxiety, Anxiety and depression (7/21/2016), Arthritis, Back pain (7/21/2016), Carpal tunnel syndrome (7/21/2016), Chronic hepatitis C without hepatic coma ([facility] [address] (7/21/2016), Chronic pain (7/21/2016), Contact dermatitis, COVID-19 vaccine series completed (05/05/2021), CTS (carpal tunnel syndrome), Depression, Fatigue (7/21/2016), GERD (gastroesophageal reflux disease), Hep C w/ coma, chronic, Hot flashes, Hyperlipidemia (7/21/2016), Insomnia (7/21/2016), Lymphadenitis, Menopause (7/21/2016), Nausea (7/21/2016), Neck pain (7/21/2016), Obesity (7/21/2016), Orbital osteo-periostitis, Osteoporosis (7/21/2016), Plantar fasciitis, Postmenopausal disorder, Psychiatric disorder, Reactive hypoglycemia, Restless leg (7/21/2016), Serotonin syndrome, Thrush, Tremor, Weakness, and Xerostomia. Ms. Jhonathan Jimenes  has a past surgical history that includes hx tubal ligation; hx cyst removal; hx tonsil and adenoidectomy; hx other surgical (11/6/98); and hx carpal tunnel release (Bilateral). 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   Prior Level of Function/Work/Activity:  Prior level of function includes independent community distance ambulation, independent functional transfers, independent stair navigation. Ambulatory/Rehab Services H2 Model Falls Risk Assessment    Risk Factors:       No Risk Factors Identified Ability to Rise from Chair:       (0)  Ability to rise in a single movement    Falls Prevention Plan:       No modifications necessary    Total: (5 or greater = High Risk): 0    ©2010 [facility] of [employer]. All Rights Reserved. [facility] [employer] Patent #[id number]. Federal Law prohibits the replication, distribution or use without written permission from [facility] [employer]    Current Medications:        Current Outpatient Medications:     ibuprofen (MOTRIN) 800 mg tablet, Take 1 Tablet by mouth every eight (8) hours as needed for Pain., Disp: 30 Tablet, Rfl: 1    cephALEXin (Keflex) 500 mg capsule, Take 1 Capsule by mouth four (4) times daily. , Disp: 12 Capsule, Rfl: 0    baclofen (LIORESAL) 20 mg tablet, two (2) times a day., Disp: , Rfl:     dextroamphetamine-amphetamine (ADDERALL) 30 mg tablet, two (2) times a day., Disp: , Rfl:     FLUoxetine (PROzac) 20 mg capsule, daily. , Disp: , Rfl:     fluticasone propionate (FLONASE) 50 mcg/actuation nasal spray, daily as needed. , Disp: , Rfl:     furosemide (LASIX) 40 mg tablet, 80 mg daily. , Disp: , Rfl:     mirabegron ER (Myrbetriq) 50 mg ER tablet, daily. , Disp: , Rfl:     montelukast (SINGULAIR) 10 mg tablet, daily. , Disp: , Rfl:     tamsulosin (FLOMAX) 0.4 mg capsule, daily. , Disp: , Rfl:     buPROPion XL (WELLBUTRIN XL) 300 mg XL tablet, Every morning., Disp: , Rfl:     buPROPion SR (Wellbutrin SR) 150 mg SR tablet, nightly., Disp: , Rfl:     rOPINIRole (Requip) 1 mg tablet, 1-2 at night, Disp: , Rfl:     clonazePAM (KLONOPIN) 2 mg tablet, Take 1 Tab by mouth two (2) times a day.  Max Daily Amount: 4 mg., Disp: 60 Tab, Rfl: 2    suvorexant (BELSOMRA) 20 mg tablet, Take 1 Tab by mouth nightly as needed for Insomnia. Max Daily Amount: 20 mg., Disp: 30 Tab, Rfl: 2    omeprazole (PRILOSEC) 20 mg capsule, Take 1 Cap by mouth every morning., Disp: 90 Cap, Rfl: 3    raloxifene (EVISTA) 60 mg tablet, Take 1 Tab by mouth daily. , Disp: 90 Tab, Rfl: 3    ibuprofen (MOTRIN) 200 mg tablet, Take  by mouth every six (6) hours as needed. , Disp: , Rfl:     calcium-cholecalciferol, d3, (CALCIUM 600 + D) 600-125 mg-unit tab, Take  by mouth daily. , Disp: , Rfl:     magnesium oxide 500 mg tab, Take 500 mg by mouth nightly. Or 250 twice daily. , Disp: , Rfl:     Date Last Reviewed:  8/6/2021    Number of Personal Factors/Comorbidities that affect the Plan of Care: 0: LOW COMPLEXITY    EXAMINATION:      Observation/Postural and Gait Assessment: Antalgic gait pattient left great than right, decreased heel to toe progress decreased bilateral step length. Anthropometric measurements (cm) Left Right   Figure of \"8\" of ankle 51.5 cm 51.5 cm   MTP joints 24 cm 22 cm     Palpation: Severe tenderness plantar surface of left foot. ROM:   AROM(PROM) in degrees Left Right   Plantarflexion WNL° WNL°   Dorsiflexion neutral° 2°   Inversion WNL° 20°   Eversion 10°, pain 15°   Passive Accessory Mobility Testing: Talocrural mobility is hypomobile. Subtalar mobility is hypomobile. Midtarsal mobility hypomobile. Tarsometatarsal mobility is hypomobile. Metatarsophalangeal(MTP) mobility is NT. Strength:   Manual Muscle Testing (*/5) Left Right   Plantarflexion 4 4   Dorsiflexion 4- 4-   Inversion 4- 3+   Eversion 4 4       Special Tests: not tested due to post op. Neurological Screen:  Myotomes: Key muscle strength testing for bilateral WNL is WNL. Dermatomes: Sensation testing through bilateral LE for light touch is WNL. Reflexes: Patellar (L4) and achilles (S1) are WNL and WNL. Body Structures Involved:  1. Nerves  2. Bones  3. Joints  4. Muscles  5.  Ligaments Body Functions Affected:  1. Neuromusculoskeletal  2. Movement Related Activities and Participation Affected:  1. Mobility  2. Interpersonal Interactions and Relationships  3.  Community, Social and [place]    Number of elements (examined above) that affect the Plan of Care: 4+: HIGH COMPLEXITY    CLINICAL PRESENTATION:    Presentation: Evolving clinical presentation with changing clinical characteristics: MODERATE COMPLEXITY    CLINICAL DECISION MAKING:    Use of outcome tool(s) and clinical judgement create a POC that gives a: Clear prediction of patient's progress: LOW COMPLEXITY

## 2021-08-11 ENCOUNTER — HOSPITAL ENCOUNTER (OUTPATIENT)
Dept: PHYSICAL THERAPY | Age: 56
Discharge: HOME | End: 2021-08-11
Payer: MEDICARE

## 2021-08-11 PROCEDURE — 97140 MANUAL THERAPY 1/> REGIONS: CPT

## 2021-08-11 PROCEDURE — 97110 THERAPEUTIC EXERCISES: CPT

## 2021-08-11 NOTE — PROGRESS NOTES
Luis F Man  : 1965  Primary: [employer] Hmo  Secondary:  [address] at [facility]  [address] [place], [address] [place] [address], John lopez, [address] [place] [address]  [phone]   [phone]      OUTPATIENT PHYSICAL THERAPY: Daily Treatment Note 2021    ICD-10: Treatment Diagnosis: Right foot pain [M79.671]                Treatment Diagnosis 2: Left foot pain [M79.672]                Treatment Diagnosis 3: Difficulty walking, not elsewhere classified [R26.2]  Precautions: None. Allergies: Ambien [zolpidem], Chantix [varenicline], Other medication, Prolia [denosumab], Tegretol [carbamazepine], and Trazodone   TREATMENT PLAN:  Effective Dates: 2021 TO 2021. Frequency/Duration: 1-2 times a week for 6 weeks. MEDICAL/REFERRING DIAGNOSIS:  Bilateral foot pain [M79.671, M79.672]  Valgus deformity of both great toes [M20.11, M20.12]  Hammer toes of both feet [M20.41, M20.42]  Pain in both feet [M79.671, M79.672]   DATE OF ONSET: s/p 2nd/3rd IP joint fusion and bunionectomy 6/3/2021. REFERRING PHYSICIAN: Jordan Wolff, *  MD Orders: Evaluate and Treat  Return MD Appointment: 9/15/2021     Pre-treatment Symptoms/Complaints:  Pt arrived 20 mins late for appt. Pt states couldn't find her car keys which is why she was late. Pt also states took her pain medication before coming in, pt demonstrate significant lethargic affect throughout treatment. Pain: Initial: Pain Intensity 1: 5  Pain Location 1: Foot  Pain Orientation 1: Right, Left  Post Session:  3/10   Medications Last Reviewed:  2021  Updated Objective Findings:  Significant increased muscle guarding observed B feet. TREATMENT:   THERAPEUTIC EXERCISE: (15 minutes):  Exercises per grid below to improve mobility, strength, balance and coordination. Required moderate visual, verbal, manual and tactile cues to promote proper body alignment, promote proper body posture and promote proper body mechanics.   Progressed resistance, range, repetitions and complexity of movement as indicated. Date:  8/6/2021 Date:  8/11/2021 Date:     Activity/Exercise Parameters Parameters Parameters   Ankle pumps x15 x15    Ankle inversion/eversion x15 x15    Ankle circles x15 x15    Ankle ABCs x1 --    Gastroc towel stretch --> [id number]                        Time spent with patient reviewing proper muscle recruitment and technique with exercises. MANUAL THERAPY: (15 minutes): Joint mobilization, Soft tissue mobilization and Manual stretching, PROM was utilized and necessary because of the patient's restricted joint motion, painful spasm, loss of articular motion and restricted motion of soft tissue   PROM: foot/ankle all planes pain free (no toe PROM)   Joint mobs: talocrual AP grade II.  Soft tissue mobilization: retrograde bilateral feet. MODALITIES: (0 minutes):      None today. HEP: As above; handouts given to patient for all exercises. Treatment/Session Summary:    · Response to Treatment:  Fair tolerance to manual and therex interventions. .  · Communication/Consultation:  HEP handout provided. · Equipment provided today:  None today  · Recommendations/Intent for next treatment session: Next visit will focus on pain reduction, improve bilateral foot/ankle AROM and strength, gait/balance training. Total Treatment Billable Duration:  30 minutes.   PT Patient Time In/Time Out  Time In: 0445  Time Out: 1316 Rene Field, PT

## 2021-08-19 ENCOUNTER — HOSPITAL ENCOUNTER (OUTPATIENT)
Dept: PHYSICAL THERAPY | Age: 56
Discharge: HOME | End: 2021-08-19
Payer: MEDICARE

## 2021-08-19 PROCEDURE — 97140 MANUAL THERAPY 1/> REGIONS: CPT

## 2021-08-19 PROCEDURE — 97110 THERAPEUTIC EXERCISES: CPT

## 2021-08-19 NOTE — PROGRESS NOTES
Chidi Castro  : 1965  Primary: [place] [employer]  Secondary:  [address] at [facility]  [address] [place], [address] [place] [address], [place], [address] [place] [address]  [phone]   [phone]      OUTPATIENT PHYSICAL THERAPY: Daily Treatment Note 2021    ICD-10: Treatment Diagnosis: Right foot pain [M79.671]                Treatment Diagnosis 2: Left foot pain [M79.672]                Treatment Diagnosis 3: Difficulty walking, not elsewhere classified [R26.2]  Precautions: None. Allergies: Ambien [zolpidem], Chantix [varenicline], Other medication, Prolia [denosumab], Tegretol [carbamazepine], and Trazodone   TREATMENT PLAN:  Effective Dates: 2021 TO 2021. Frequency/Duration: 1-2 times a week for 6 weeks. MEDICAL/REFERRING DIAGNOSIS:  Bilateral foot pain [M79.671, M79.672]  Valgus deformity of both great toes [M20.11, M20.12]  Hammer toes of both feet [M20.41, M20.42]  Pain in both feet [M79.671, M79.672]   DATE OF ONSET: s/p 2nd/3rd IP joint fusion and bunionectomy 6/3/2021. REFERRING PHYSICIAN: Rosario Riggins, *  MD Orders: Evaluate and Treat  Return MD Appointment: 9/15/2021     Pre-treatment Symptoms/Complaints:  Patient reports pain is about the same and she is frustrated. .    Pain: Initial: Pain Intensity 1: 8  Pain Location 1: Foot  Pain Orientation 1: Right, Left (L 8/10  and R 6/10)  Post Session:  3/10   Medications Last Reviewed:  2021  Updated Objective Findings:  None Today   TREATMENT:   THERAPEUTIC EXERCISE: (15 minutes):  Exercises per grid below to improve mobility, strength, balance and coordination. Required moderate visual, verbal, manual and tactile cues to promote proper body alignment, promote proper body posture and promote proper body mechanics. Progressed resistance, range, repetitions and complexity of movement as indicated.      Date:  2021 Date:  2021 Date:  21   Activity/Exercise Parameters Parameters Parameters Ankle pumps x15 x15 3 x 10   Ankle inversion/eversion x15 x15 2 x 10   Ankle circles x15 x15 2 x 10   Ankle ABCs x1 -- X 1    Gastroc towel stretch --> [id number] x 30 sec                       Time spent with patient reviewing proper muscle recruitment and technique with exercises. MANUAL THERAPY: (25 minutes): Joint mobilization, Soft tissue mobilization and Manual stretching, PROM was utilized and necessary because of the patient's restricted joint motion, painful spasm, loss of articular motion and restricted motion of soft tissue   PROM: foot/ankle all planes pain free (no toe PROM)   Joint mobs: talocrual AP grade II.  Soft tissue mobilization: retrograde bilateral feet. MODALITIES: (0 minutes):      None today. HEP: As above; handouts given to patient for all exercises. Treatment/Session Summary:    · Response to Treatment:  Reports some relief after session. · Communication/Consultation:  HEP handout provided. · Equipment provided today:  None today  · Recommendations/Intent for next treatment session: Next visit will focus on pain reduction, improve bilateral foot/ankle AROM and strength, gait/balance training. Total Treatment Billable Duration:  40 minutes.   PT Patient Time In/Time Out  Time In: 1532  Time Out: Milind 3, PTA

## 2021-08-25 ENCOUNTER — HOSPITAL ENCOUNTER (OUTPATIENT)
Dept: PHYSICAL THERAPY | Age: 56
Discharge: HOME | End: 2021-08-25
Payer: MEDICARE

## 2021-08-25 PROCEDURE — 97140 MANUAL THERAPY 1/> REGIONS: CPT

## 2021-08-25 PROCEDURE — 97110 THERAPEUTIC EXERCISES: CPT

## 2021-08-26 ENCOUNTER — HOSPITAL ENCOUNTER (OUTPATIENT)
Dept: PHYSICAL THERAPY | Age: 56
Discharge: HOME | End: 2021-08-26
Payer: MEDICARE

## 2021-08-26 NOTE — PROGRESS NOTES
Patient cancelled appointment stating her foot hurt too much and declined rescheduling per office staff.           Nyasia Seo, PTA

## 2021-08-27 ENCOUNTER — APPOINTMENT (OUTPATIENT)
Dept: PHYSICAL THERAPY | Age: 56
End: 2021-08-27
Payer: MEDICARE

## 2021-08-31 ENCOUNTER — HOSPITAL ENCOUNTER (OUTPATIENT)
Dept: PHYSICAL THERAPY | Age: 56
Discharge: HOME | End: 2021-08-31
Payer: MEDICARE

## 2021-08-31 PROCEDURE — 97140 MANUAL THERAPY 1/> REGIONS: CPT

## 2021-08-31 NOTE — PROGRESS NOTES
Guzman Spain  : 1965  Primary: Gab[employer] [employer] Hmo  Secondary:  [address] at [facility]  [address] [place], [address] [place] [address], [place], [address] [place] [address]  [phone]   [phone]      OUTPATIENT PHYSICAL THERAPY: Daily Treatment Note 2021    ICD-10: Treatment Diagnosis: Right foot pain [M79.671]                Treatment Diagnosis 2: Left foot pain [M79.672]                Treatment Diagnosis 3: Difficulty walking, not elsewhere classified [R26.2]  Precautions: None. Allergies: Ambien [zolpidem], Chantix [varenicline], Other medication, Prolia [denosumab], Tegretol [carbamazepine], and Trazodone   TREATMENT PLAN:  Effective Dates: 2021 TO 2021. Frequency/Duration: 1-2 times a week for 6 weeks. MEDICAL/REFERRING DIAGNOSIS:  Bilateral foot pain [M79.671, M79.672]  Valgus deformity of both great toes [M20.11, M20.12]  Hammer toes of both feet [M20.41, M20.42]  Pain in both feet [M79.671, M79.672]   DATE OF ONSET: s/p 2nd/3rd IP joint fusion and bunionectomy 6/3/2021. REFERRING PHYSICIAN: Papa Tracey, *  MD Orders: Evaluate and Treat  Return MD Appointment: 9/15/2021     Pre-treatment Symptoms/Complaints:  Pt reports increased left ankle and foot pain after last session. States she is very frustrated and doesn't feel therapy is helping at this point. Pain: Initial: Pain Intensity 1: 6  Pain Location 1: Foot  Pain Orientation 1: Left  Post Session:  6/10   Medications Last Reviewed:  2021  Updated Objective Findings:  None Today   TREATMENT:   THERAPEUTIC EXERCISE: (0 minutes):  Exercises per grid below to improve mobility, strength, balance and coordination. Required moderate visual, verbal, manual and tactile cues to promote proper body alignment, promote proper body posture and promote proper body mechanics. Progressed resistance, range, repetitions and complexity of movement as indicated.      Date:  2021 Date:  2021 Date:  2021 Activity/Exercise Parameters Parameters Parameters   Ankle pumps x15 x15 x20   Ankle inversion/eversion x15 x15 x20   Ankle circles x15 x15 x20   Ankle ABCs x1 -- x1   Gastroc towel stretch --> [id number]   Windshield wiper   -->                 Time spent with patient reviewing proper muscle recruitment and technique with exercises. MANUAL THERAPY: (30 minutes): Joint mobilization, Soft tissue mobilization and Manual stretching, PROM was utilized and necessary because of the patient's restricted joint motion, painful spasm, loss of articular motion and restricted motion of soft tissue   PROM: foot/ankle all planes pain free (no toe PROM)   Joint mobs: talocrual AP grade II.  Soft tissue mobilization: retrograde left foot. MODALITIES: (0 minutes):      None today. HEP: As above; handouts given to patient for all exercises. Treatment/Session Summary:    · Response to Treatment:  No change in symptoms. Patient declined exercises. · Communication/Consultation:  None today  · Equipment provided today:  None today  · Recommendations/Intent for next treatment session: Next visit will focus on pain reduction, improve bilateral foot/ankle AROM and strength, gait/balance training. Total Treatment Billable Duration: 30 minutes.   PT Patient Time In/Time Out  Time In: 1450 (patient late)  Time Out: 222 Adan English, PTA

## 2021-09-02 ENCOUNTER — HOSPITAL ENCOUNTER (OUTPATIENT)
Dept: PHYSICAL THERAPY | Age: 56
Discharge: HOME | End: 2021-09-02
Payer: MEDICARE

## 2021-09-02 PROCEDURE — 97140 MANUAL THERAPY 1/> REGIONS: CPT
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
## 2021-09-02 NOTE — PROGRESS NOTES
Vasiliy Nuzhat  : 1965  Primary: Patric Livingston Medicare Hmo  Secondary:  [address] at [place] [address] [address], John lopez, [address] [place] [address]  [phone]   [phone]       OUTPATIENT PHYSICAL THERAPY:Progress Report 2021    ICD-10: Treatment Diagnosis: Right foot pain [M79.671]                Treatment Diagnosis 2: Left foot pain [M79.672]                Treatment Diagnosis 3: Difficulty walking, not elsewhere classified [R26.2]  Precautions: None. Allergies: Ambien [zolpidem], Chantix [varenicline], Other medication, Prolia [denosumab], Tegretol [carbamazepine], and Trazodone   TREATMENT PLAN:  Effective Dates: 2021 TO 2021. Frequency/Duration: 1-2 times a week for 6 weeks. MEDICAL/REFERRING DIAGNOSIS:  Pain in right foot [M79.671]  Pain in left foot [M79.672]  Hallux valgus (acquired), right foot [M20.11]  Hallux valgus (acquired), left foot [M20.12]  Other hammer toe(s) (acquired), right foot [M20.41]  Other hammer toe(s) (acquired), left foot [M20.42]   DATE OF ONSET: s/p 2nd/3rd IP joint fusion and bunionectomy 6/3/2021. REFERRING PHYSICIAN: Kimberly Riggins, *  MD Orders: Evaluate and Treat  Return MD Appointment: 9/15/2021     INITIAL ASSESSMENT:  Ms. Teri Schuler is a 61 y.o. female presenting to physical therapy with complaints of bilateral foot pain s/p 2nd/3rd IP joint fusion and bunionectomy 6/3/2021. Pt reports has been slowly recovering since surgery however has continued to have increased pain levels especially with prolonged walking/standing. Pt reports that PT was recommended several weeks ago however she chose not to begin PT treatment until now due to MD strong recommendation and patient education regarding the need to undergo PT treatment following her specific surgery. Pt reports burning on plantar surface of left foot however denies numbness/tingling symptoms.  Pt reports has difficulty walking prolonged distances greater that 200 ft; difficulty navigation uneven surfaces. Pt will benefit from skilled PT treatment to address deficits in strength, AROM, balance and gait in order to improve quality of life and return to prior level of function. PROGRESS NOTE 9/2/2021: Pt has completed 6 PT treatment session from 8/6/2021 to 9/2/2021. Pt demonstrate excellent improvement regarding right foot AROM, strength and functional activity tolerance; however is showing some regression regarding left foot functional progress. Pt compliance with HEP and PT treatment plan has been fair to poor. Pt verbalizes having difficulty controlling pain in left foot. Primary limitations that remain include limited tolerance to ambulation, limited functional transfer ability, limited housework and ADL ability. Pt will benefit from skilled PT treatment to address remaining functional deficits, return to prior level of function and improve quality of life. PROBLEM LIST (Impacting functional limitations):  1. Decreased Strength  2. Decreased ADL/Functional Activities  3. Decreased Transfer Abilities  4. Decreased Ambulation Ability/Technique  5. Decreased Balance  6. Increased Pain  7. Decreased Activity Tolerance  8. Increased Fatigue  9. Decreased Flexibility/Joint Mobility  10. Edema/Girth  11. Decreased Skin Integrity/Hygeine  12. Decreased [place] with Home Exercise Program INTERVENTIONS PLANNED: (Treatment may consist of any combination of the following)  1. Balance Exercise  2. Bed Mobility  3. Cryotherapy  4. Electrical Stimulation  5. Gait Training  6. Heat  7. Home Exercise Program (HEP)  8. Manual Therapy  9. Neuromuscular Re-education/Strengthening  10. Range of Motion (ROM)  11. Therapeutic Activites  12. Therapeutic Exercise/Strengthening  13. Transcutaneous Electrical Nerve Stimulation (TENS)  14. Transfer Training  15.  Ultrasound (US)     GOALS: (Goals have been discussed and agreed upon with patient.)  Short-Term Functional Goals: Time Frame: 8/6/2021 to 8/27/2021  1. Patient demonstrates independence with home exercise program without verbal cueing provided by therapist. - ONGOING  2. Pt will reports worst pain 5/10 or less when walking 250 ft in order to progress walking tolerance. - ONGOING  Discharge Goals: Time Frame: 8/6/2021 to 9/17/2021  1. Pt will reports worst pain 3/10 or less when walking 750 ft in order to return to community distance ambulation. - ONGOING  2. Pt will demonstrate bilateral ankle AROM DF: 10 dgs, Inv: 30 dgs, Ev: 15 degrees in order to assist in restoring normal gait mechanics. - ONGOING  3. Pt will demonstrate gross bilateral ankle/foot strength to 4+ to 5/5 in order to improve functional transfer ability. - ONGOING  4. FAAM score of 50/84 or greater in order to demonstrate overall functional improvement. - ONGOING    Outcome Measure: Tool Used: PT/OT FOOT AND ANKLE ABILITY MEASURE  Score:  Initial: 25/84 Most Recent: NT (Date: 9/2/2021 )   Interpretation of Score: For the \"Activities of Daily Living\", there are 21 questions each scored on a 5 point scale with 0 representing \"Unable to do\" and 4 representing \"No difficulty\". The lower the score, the greater the functional disability. 84/84 represents no disability. Minimal detectable change is 5.7 points. With the addition of the 8 questions in the \"Sports Subscale,\" there are 29 questions, each scored on a 5 point scale with 0 representing \"Unable to do\" and 4 representing \"No difficulty\". The lower the score, the greater the functional disability. 116/116 represents no disability. Minimal detectable change is 12.3 points. Observation/Postural and Gait Assessment: Antalgic gait pattient left greater than right, decreased heel to toe progress decreased bilateral step length, uses single point cane. Observation: dorsum of left foot skin abrasion; visible scabbing, two areas open approximately 5 mm in diameter, no signs of infection.     Anthropometric measurements (cm) Left Right   Figure of \"8\" of ankle 52 cm 50 cm   MTP joints 24 cm 22 cm     Palpation: No tenderness reported upon palpation of left foot plantar fascia, mild tenderness left gastroc. ROM:   AROM(PROM) in degrees Left Right   Plantarflexion WNL° WNL°   Dorsiflexion neutral° 8°   Inversion 30° 30°   Eversion 10°, pain 15°   Passive Accessory Mobility Testing: Talocrural mobility is hypomobile. Subtalar mobility is hypomobile. Midtarsal mobility hypomobile. Tarsometatarsal mobility is hypomobile. Metatarsophalangeal(MTP) mobility is NT. Strength:   Manual Muscle Testing (*/5) Left Right   Plantarflexion 4 4   Dorsiflexion 4- 4   Inversion 4- 4-   Eversion 4 4       Special Tests: not tested due to post op. Neurological Screen:  Myotomes: Key muscle strength testing for bilateral WNL is WNL. Dermatomes: Sensation testing through bilateral LE for light touch is WNL. Reflexes: Patellar (L4) and achilles (S1) are WNL and WNL. Medical Necessity:   · Patient is expected to demonstrate progress in strength, range of motion, balance, coordination and functional technique to increase independence with community distance ambulation, housework, stair navigation. .  · Skilled intervention continues to be required due to decreased strength, decreased gait, decreased balance, decreased AROM, decreased functional activity tolernance. .  Reason for Services/Other Comments:  · Patient continues to require skilled intervention due to increasing complexity of exercise. .    Rehabilitation Potential For Stated Goals: Good  Regarding Oziel Levi's therapy, I certify that the treatment plan above will be carried out by a therapist or under their direction. Thank you for this referral,  Daryl Apgar, PT   DPT  Referring Physician Signature: Rose Cranker, Darroll Reels, * No Signature is Required for this note.

## 2021-09-07 ENCOUNTER — HOSPITAL ENCOUNTER (OUTPATIENT)
Dept: PHYSICAL THERAPY | Age: 56
Discharge: HOME | End: 2021-09-07
Payer: MEDICARE

## 2021-09-07 PROCEDURE — 97110 THERAPEUTIC EXERCISES: CPT

## 2021-09-07 PROCEDURE — 97140 MANUAL THERAPY 1/> REGIONS: CPT

## 2021-09-07 NOTE — PROGRESS NOTES
Estuardo Balaji  : 1965  Primary: Francis Livingston Medicare Hmo  Secondary:  [address] at [facility]  [address] [place], [address] [place] [address], [place], [address] [place] [address]  [phone]   [phone]      OUTPATIENT PHYSICAL THERAPY: Daily Treatment Note 2021    ICD-10: Treatment Diagnosis: Right foot pain [M79.671]                Treatment Diagnosis 2: Left foot pain [M79.672]                Treatment Diagnosis 3: Difficulty walking, not elsewhere classified [R26.2]  Precautions: None. Allergies: Ambien [zolpidem], Chantix [varenicline], Other medication, Prolia [denosumab], Tegretol [carbamazepine], and Trazodone   TREATMENT PLAN:  Effective Dates: 2021 TO 2021. Frequency/Duration: 1-2 times a week for 6 weeks. MEDICAL/REFERRING DIAGNOSIS:  Bilateral foot pain [M79.671, M79.672]  Valgus deformity of both great toes [M20.11, M20.12]  Hammer toes of both feet [M20.41, M20.42]  Pain in both feet [M79.671, M79.672]   DATE OF ONSET: s/p 2nd/3rd IP joint fusion and bunionectomy 6/3/2021. REFERRING PHYSICIAN: Stella Grullon, *  MD Orders: Evaluate and Treat  Return MD Appointment: 9/15/2021     Pre-treatment Symptoms/Complaints:  Pt .reports she is about the same. Pain: Initial: Pain Intensity 1: 8  Pain Location 1: Foot, Toe (comment which one)  Pain Orientation 1: Left  Post Session:  6/10   Medications Last Reviewed:  2021  Updated Objective Findings:  See progress report dated 2021. Skin abrasion on dorsum of left foot appears to be healing    TREATMENT:   THERAPEUTIC EXERCISE: (25 minutes):  Exercises per grid below to improve mobility, strength, balance and coordination. Required moderate visual, verbal, manual and tactile cues to promote proper body alignment, promote proper body posture and promote proper body mechanics. Progressed resistance, range, repetitions and complexity of movement as indicated.      Date:   Date:  2021 Date:  2021 Activity/Exercise Parameters Parameters Parameters   Ankle pumps 3 x 10 x15 x20   Ankle inversion/eversion 3 x 15 with manual cues x15 x20   Ankle circles 3 x 10 x15 x20   Ankle ABCs X 1 -- x1   Gastroc towel stretch  [id number]   Windshield wiper 3 x 10  -->   Straight leg raises 3 x 5     Long arc quads 2 x 10       Time spent with patient reviewing proper muscle recruitment and technique with exercises. MANUAL THERAPY: (28 minutes): Joint mobilization, Soft tissue mobilization and Manual stretching, PROM was utilized and necessary because of the patient's restricted joint motion, painful spasm, loss of articular motion and restricted motion of soft tissue   PROM: foot/ankle all planes pain free (no toe PROM)   Joint mobs: talocrual AP grade II.  Soft tissue mobilization: retrograde left foot; plantar fascia right foot, gastroc bilaterally. MODALITIES: (0 minutes):      None today. HEP: As above; handouts given to patient for all exercises. Treatment/Session Summary:    · Response to Treatment:  No change in pain. slightly improved exercise ability after manual cueing. · Communication/Consultation:  None today  · Equipment provided today:  None today  · Recommendations/Intent for next treatment session: Next visit will focus on pain reduction, improve bilateral foot/ankle AROM and strength, gait/balance training. Total Treatment Billable Duration: 53 minutes.   PT Patient Time In/Time Out  Time In: 1560  Time Out: 600 [facility]

## 2021-09-09 ENCOUNTER — HOSPITAL ENCOUNTER (OUTPATIENT)
Dept: PHYSICAL THERAPY | Age: 56
Discharge: HOME | End: 2021-09-09
Payer: MEDICARE

## 2021-09-13 ENCOUNTER — HOSPITAL ENCOUNTER (OUTPATIENT)
Dept: PHYSICAL THERAPY | Age: 56
Discharge: HOME | End: 2021-09-13
Payer: MEDICARE

## 2021-09-13 PROCEDURE — 97110 THERAPEUTIC EXERCISES: CPT

## 2021-09-13 PROCEDURE — 97140 MANUAL THERAPY 1/> REGIONS: CPT

## 2021-09-13 NOTE — PROGRESS NOTES
Asim Sol  : 1965  Primary: Sarah Livingston Medicare Hmo  Secondary:  [address] at [facility]  [address] [place], [address] [place] [address], [place] lopez, [address] [place] [address]  [phone]   [phone]      OUTPATIENT PHYSICAL THERAPY: Daily Treatment Note 2021    ICD-10: Treatment Diagnosis: Right foot pain [M79.671]                Treatment Diagnosis 2: Left foot pain [M79.672]                Treatment Diagnosis 3: Difficulty walking, not elsewhere classified [R26.2]  Precautions: None. Allergies: Ambien [zolpidem], Chantix [varenicline], Other medication, Prolia [denosumab], Tegretol [carbamazepine], and Trazodone   TREATMENT PLAN:  Effective Dates: 2021 TO 2021. Frequency/Duration: 1-2 times a week for 6 weeks. MEDICAL/REFERRING DIAGNOSIS:  Bilateral foot pain [M79.671, M79.672]  Valgus deformity of both great toes [M20.11, M20.12]  Hammer toes of both feet [M20.41, M20.42]  Pain in both feet [M79.671, M79.672]   DATE OF ONSET: s/p 2nd/3rd IP joint fusion and bunionectomy 6/3/2021. REFERRING PHYSICIAN: Willma Leventhal, *  MD Orders: Evaluate and Treat  Return MD Appointment: 9/15/2021     Pre-treatment Symptoms/Complaints:  Pt reports root foot is \"doing good\"; left foot pain continues to remain severe. Pain: Initial: Pain Intensity 1: 8  Pain Location 1: Foot  Pain Orientation 1: Left  Post Session:  7/10   Medications Last Reviewed:  2021  Updated Objective Findings:  Gait:  Mild antalgic LLE without use of single point cane; Observation: Skin abrasion healing, now only two locations approximately 3 mm in diameter. Left ankle AROM: DF: neutral, PF: 40 degrees, Inv: 15 degrees, Ev: 8 degrees. TREATMENT:   THERAPEUTIC EXERCISE: (23 minutes):  Exercises per grid below to improve mobility, strength, balance and coordination.   Required moderate visual, verbal, manual and tactile cues to promote proper body alignment, promote proper body posture and promote proper body mechanics. Progressed resistance, range, repetitions and complexity of movement as indicated. Date:  9/13/2021 Date:  8/11/2021 Date:  8/25/2021   Activity/Exercise Parameters Parameters Parameters   Ankle pumps x15 x15 x20   Ankle inversion/eversion x15 x15 x20   Ankle circles x15 x15 x20   Ankle ABCs x1 -- x1   Gastroc towel stretch [id number]   Windshield wiper x5 each  -->   Arch squeeze Seated, 5x5 sec     Towel scrunch -->        Time spent with patient reviewing proper muscle recruitment and technique with exercises. MANUAL THERAPY: (30 minutes): Joint mobilization, Soft tissue mobilization and Manual stretching, PROM was utilized and necessary because of the patient's restricted joint motion, painful spasm, loss of articular motion and restricted motion of soft tissue   PROM: foot/ankle all planes pain free (no toe PROM)   Joint mobs: talocrual AP grade II.  Soft tissue mobilization: retrograde left foot; plantar fascia right foot, gastroc bilaterally. MODALITIES: (0 minutes):      None today. HEP: As above; handouts given to patient for all exercises. Treatment/Session Summary:    · Response to Treatment:  Fair tolerance to manual and therex interventions provided. · Communication/Consultation:  None today  · Equipment provided today:  None today  · Recommendations/Intent for next treatment session: Next visit will focus on pain reduction, improve bilateral foot/ankle AROM and strength, gait/balance training. Total Treatment Billable Duration: 53 mins.   PT Patient Time In/Time Out  Time In: 3519  Time Out: 3946 [address],

## 2021-09-13 NOTE — PROGRESS NOTES
Piyush Hidalgo  : 1965 [facility]  [address] [place], [address] [place] [address] Avenue, [place] lopez, [address] [place] [address] Street  [phone]   [phone]      OUTPATIENT PHYSICAL THERAPY: PHYSICIAN COMMUNICATION    REFERRING PHYSICIAN: Marylou Bui, *  Return Physician Appointment: 9/15/2021  MEDICAL/REFERRING DIAGNOSIS:  · Pain in right foot [M79.671]  · Pain in left foot [M79.672]  · Hallux valgus (acquired), right foot [M20.11]  · Hallux valgus (acquired), left foot [M20.12]  · Other hammer toe(s) (acquired), right foot [M20.41]  · Other hammer toe(s) (acquired), left foot [M20.42]  ATTENDANCE: Piyush Hidalgo has attended 8 sessions of therapy from 2021 to 2021 with 3 cancelled visits. ASSESSMENT:DATE: 2021    PROGRESS: Piyush Hidalgo overall compliance with PT treatment plan to date has been fair to poor. Pt demonstrates significant improvements regarding right foot/ankle AROM, strength and functional activity tolerance however is struggling regarding progression of left foot/ankle due to pt reported severe pain levels that have not improve since starting PT treatment. Left ankle AROM as follows: DF: neutral, PF: 40 degree, Inversion: 15 degrees, Eversion: 8 degrees. Pt now ambulating without assistive device limited community distances. Please advise any specific activities or exercises you would like patient to perform or avoid. RECOMMENDATIONS: Continue therapy 2 times a week through certification period/until goals are met. Thank you for this referral, and please do not hesitate to contact me at the number listed above if you have any questions.     Kellie Sims, PT, DPT

## 2021-09-17 ENCOUNTER — HOSPITAL ENCOUNTER (OUTPATIENT)
Dept: PHYSICAL THERAPY | Age: 56
Discharge: HOME | End: 2021-09-17
Payer: MEDICARE

## 2021-09-17 NOTE — PROGRESS NOTES
Physical Therapy  [address] [place] [address]  Date: 9/17/2021    Patient called and cancelled appointment for today; pt states she is planning to have additional surgery to left foot.       JOB CarrilloT

## 2021-09-20 NOTE — THERAPY DISCHARGE
Forrest Ely  : 1965  Primary: Raza Living[place] Medicare Hmo  Secondary:  [address] at [facility]  [address] [facility], [address] [place] [address], John lopez, [address] [place] [address]  [phone]   [phone]       OUTPATIENT PHYSICAL THERAPY:Discharge 2021    ICD-10: Treatment Diagnosis: Right foot pain [M79.671]                Treatment Diagnosis 2: Left foot pain [M79.672]                Treatment Diagnosis 3: Difficulty walking, not elsewhere classified [R26.2]  Precautions: None. Allergies: Ambien [zolpidem], Chantix [varenicline], Other medication, Prolia [denosumab], Tegretol [carbamazepine], and Trazodone   TREATMENT PLAN:  Effective Dates: 2021 TO 2021. Frequency/Duration: 1-2 times a week for 6 weeks. MEDICAL/REFERRING DIAGNOSIS:  Pain in right foot [M79.671]  Pain in left foot [M79.672]  Hallux valgus (acquired), right foot [M20.11]  Hallux valgus (acquired), left foot [M20.12]  Other hammer toe(s) (acquired), right foot [M20.41]  Other hammer toe(s) (acquired), left foot [M20.42]   DATE OF ONSET: s/p 2nd/3rd IP joint fusion and bunionectomy 6/3/2021. REFERRING PHYSICIAN: Yolanda Blakely, *  MD Orders: Evaluate and Treat  Return MD Appointment: 9/15/2021     INITIAL ASSESSMENT:  Ms. Kia Phillip is a 61 y.o. female presenting to physical therapy with complaints of bilateral foot pain s/p 2nd/3rd IP joint fusion and bunionectomy 6/3/2021. Pt reports has been slowly recovering since surgery however has continued to have increased pain levels especially with prolonged walking/standing. Pt reports that PT was recommended several weeks ago however she chose not to begin PT treatment until now due to MD strong recommendation and patient education regarding the need to undergo PT treatment following her specific surgery. Pt reports burning on plantar surface of left foot however denies numbness/tingling symptoms.  Pt reports has difficulty walking prolonged distances greater that 200 ft; difficulty navigation uneven surfaces. Pt will benefit from skilled PT treatment to address deficits in strength, AROM, balance and gait in order to improve quality of life and return to prior level of function. DISCHARGE NOTE 9/20/2021: Pt has completed 8 PT treatment sessions with 4 cancelled visits from 8/6/2021 to 9/17/2021. Overall compliance with treatment plan was fair to poor. At time of discharge patient was still struggling with high pain levels and limited functional independence regarding left foot; right foot progress was excellent. Pt called on last scheduled appointment and requested discharge from PT treatment stating that she planned to return to MD to assess status of left foot and no longer wished to continued PT treatment. Pt to be discharged per request. Goal status reflective of pt progress at last progress note on 9/2/2021. PROBLEM LIST (Impacting functional limitations):  1. Decreased Strength  2. Decreased ADL/Functional Activities  3. Decreased Transfer Abilities  4. Decreased Ambulation Ability/Technique  5. Decreased Balance  6. Increased Pain  7. Decreased Activity Tolerance  8. Increased Fatigue  9. Decreased Flexibility/Joint Mobility  10. Edema/Girth  11. Decreased Skin Integrity/Hygeine  12. Decreased [place] with Home Exercise Program INTERVENTIONS PLANNED: (Treatment may consist of any combination of the following)  1. Balance Exercise  2. Bed Mobility  3. Cryotherapy  4. Electrical Stimulation  5. Gait Training  6. Heat  7. Home Exercise Program (HEP)  8. Manual Therapy  9. Neuromuscular Re-education/Strengthening  10. Range of Motion (ROM)  11. Therapeutic Activites  12. Therapeutic Exercise/Strengthening  13. Transcutaneous Electrical Nerve Stimulation (TENS)  14. Transfer Training  15. Ultrasound (US)     GOALS: (Goals have been discussed and agreed upon with patient.)  Short-Term Functional Goals: Time Frame: 8/6/2021 to 8/27/2021  1.  Patient demonstrates independence with home exercise program without verbal cueing provided by therapist. - NOT MET  2. Pt will reports worst pain 5/10 or less when walking 250 ft in order to progress walking tolerance. - NOT MET  Discharge Goals: Time Frame: 8/6/2021 to 9/17/2021  1. Pt will reports worst pain 3/10 or less when walking 750 ft in order to return to community distance ambulation. - NOT MET  2. Pt will demonstrate bilateral ankle AROM DF: 10 dgs, Inv: 30 dgs, Ev: 15 degrees in order to assist in restoring normal gait mechanics. - NOT MET  3. Pt will demonstrate gross bilateral ankle/foot strength to 4+ to 5/5 in order to improve functional transfer ability. - NOT MET  4. FAAM score of 50/84 or greater in order to demonstrate overall functional improvement. - NOT MET    Outcome Measure: Tool Used: PT/OT FOOT AND ANKLE ABILITY MEASURE  Score:  Initial: 25/84 Most Recent: NT (Date: 9/2/2021 )   Interpretation of Score: For the \"Activities of Daily Living\", there are 21 questions each scored on a 5 point scale with 0 representing \"Unable to do\" and 4 representing \"No difficulty\". The lower the score, the greater the functional disability. 84/84 represents no disability. Minimal detectable change is 5.7 points. With the addition of the 8 questions in the \"Sports Subscale,\" there are 29 questions, each scored on a 5 point scale with 0 representing \"Unable to do\" and 4 representing \"No difficulty\". The lower the score, the greater the functional disability. 116/116 represents no disability. Minimal detectable change is 12.3 points. Observation/Postural and Gait Assessment: Antalgic gait pattient left greater than right, decreased heel to toe progress decreased bilateral step length, uses single point cane. Observation: dorsum of left foot skin abrasion; visible scabbing, two areas open approximately 5 mm in diameter, no signs of infection.     Anthropometric measurements (cm) Left Right   Figure of \"8\" of ankle 52 cm 50 cm   MTP joints 24 cm 22 cm     Palpation: No tenderness reported upon palpation of left foot plantar fascia, mild tenderness left gastroc. ROM:   AROM(PROM) in degrees Left Right   Plantarflexion WNL° WNL°   Dorsiflexion neutral° 8°   Inversion 30° 30°   Eversion 10°, pain 15°   Passive Accessory Mobility Testing: Talocrural mobility is hypomobile. Subtalar mobility is hypomobile. Midtarsal mobility hypomobile. Tarsometatarsal mobility is hypomobile. Metatarsophalangeal(MTP) mobility is NT. Strength:   Manual Muscle Testing (*/5) Left Right   Plantarflexion 4 4   Dorsiflexion 4- 4   Inversion 4- 4-   Eversion 4 4       Special Tests: not tested due to post op. Neurological Screen:  Myotomes: Key muscle strength testing for bilateral WNL is WNL. Dermatomes: Sensation testing through bilateral LE for light touch is WNL. Reflexes: Patellar (L4) and achilles (S1) are WNL and WNL. Regarding Anayeli Levi's therapy, I certify that the treatment plan above will be carried out by a therapist or under their direction. Thank you for this referral,  Deepak Whitaker, PT   DPT  Referring Physician Signature: Joella Beady, Kassie Mortimer, * No Signature is Required for this note.

## 2021-09-22 ENCOUNTER — ANESTHESIA EVENT (OUTPATIENT)
Dept: SURGERY | Age: 56
End: 2021-09-22
Payer: MEDICARE

## 2021-09-23 ENCOUNTER — HOSPITAL ENCOUNTER (OUTPATIENT)
Age: 56
Setting detail: OUTPATIENT SURGERY
Discharge: HOME | End: 2021-09-23
Attending: ORTHOPAEDIC SURGERY | Admitting: ORTHOPAEDIC SURGERY
Payer: MEDICARE

## 2021-09-23 ENCOUNTER — ANESTHESIA (OUTPATIENT)
Dept: SURGERY | Age: 56
End: 2021-09-23
Payer: MEDICARE

## 2021-09-23 ENCOUNTER — APPOINTMENT (OUTPATIENT)
Dept: GENERAL RADIOLOGY | Age: 56
End: 2021-09-23
Attending: ORTHOPAEDIC SURGERY
Payer: MEDICARE

## 2021-09-23 VITALS
HEART RATE: 74 BPM | RESPIRATION RATE: 18 BRPM | SYSTOLIC BLOOD PRESSURE: 111 MMHG | BODY MASS INDEX: 26.61 KG/M2 | OXYGEN SATURATION: 97 % | WEIGHT: 155 LBS | DIASTOLIC BLOOD PRESSURE: 72 MMHG | TEMPERATURE: 97.8 F

## 2021-09-23 DIAGNOSIS — G89.18 ACUTE POST-OPERATIVE PAIN: Primary | ICD-10-CM

## 2021-09-23 LAB — POTASSIUM BLD-SCNC: 3.4 MMOL/L (ref 3.5–5.1)

## 2021-09-23 PROCEDURE — C1713 ANCHOR/SCREW BN/BN,TIS/BN: HCPCS | Performed by: ORTHOPAEDIC SURGERY

## 2021-09-23 PROCEDURE — 28299 COR HLX VLGS DOUBLE OSTEOT: CPT | Performed by: NURSE PRACTITIONER

## 2021-09-23 PROCEDURE — 76010000161 HC OR TIME 1 TO 1.5 HR INTENSV-TIER 1: Performed by: ORTHOPAEDIC SURGERY

## 2021-09-23 PROCEDURE — 28299 COR HLX VLGS DOUBLE OSTEOT: CPT | Performed by: ORTHOPAEDIC SURGERY

## 2021-09-23 PROCEDURE — 74011000250 HC RX REV CODE- 250: Performed by: ANESTHESIOLOGY

## 2021-09-23 PROCEDURE — 20900 REMOVAL OF BONE FOR GRAFT: CPT | Performed by: ORTHOPAEDIC SURGERY

## 2021-09-23 PROCEDURE — 84132 ASSAY OF SERUM POTASSIUM: CPT

## 2021-09-23 PROCEDURE — 74011250636 HC RX REV CODE- 250/636: Performed by: ANESTHESIOLOGY

## 2021-09-23 PROCEDURE — 77030040572 HC DRVR HEX PROSTEP MICA WRGH -C: Performed by: ORTHOPAEDIC SURGERY

## 2021-09-23 PROCEDURE — 74011250636 HC RX REV CODE- 250/636: Performed by: STUDENT IN AN ORGANIZED HEALTH CARE EDUCATION/TRAINING PROGRAM

## 2021-09-23 PROCEDURE — 76010010054 HC POST OP PAIN BLOCK: Performed by: ORTHOPAEDIC SURGERY

## 2021-09-23 PROCEDURE — 77030006788 HC BLD SAW OSC STRY -B: Performed by: ORTHOPAEDIC SURGERY

## 2021-09-23 PROCEDURE — 76942 ECHO GUIDE FOR BIOPSY: CPT | Performed by: ORTHOPAEDIC SURGERY

## 2021-09-23 PROCEDURE — 77030002916 HC SUT ETHLN J&J -A: Performed by: ORTHOPAEDIC SURGERY

## 2021-09-23 PROCEDURE — 77030003602 HC NDL NRV BLK BBMI -B: Performed by: ANESTHESIOLOGY

## 2021-09-23 PROCEDURE — 74011250637 HC RX REV CODE- 250/637: Performed by: ANESTHESIOLOGY

## 2021-09-23 PROCEDURE — 20900 REMOVAL OF BONE FOR GRAFT: CPT | Performed by: NURSE PRACTITIONER

## 2021-09-23 PROCEDURE — 77030010509 HC AIRWY LMA MSK TELE -A: Performed by: STUDENT IN AN ORGANIZED HEALTH CARE EDUCATION/TRAINING PROGRAM

## 2021-09-23 PROCEDURE — 74011250636 HC RX REV CODE- 250/636: Performed by: NURSE PRACTITIONER

## 2021-09-23 PROCEDURE — 77030002933 HC SUT MCRYL J&J -A: Performed by: ORTHOPAEDIC SURGERY

## 2021-09-23 PROCEDURE — 76210000006 HC OR PH I REC 0.5 TO 1 HR: Performed by: ORTHOPAEDIC SURGERY

## 2021-09-23 PROCEDURE — 74011250636 HC RX REV CODE- 250/636

## 2021-09-23 PROCEDURE — 76210000020 HC REC RM PH II FIRST 0.5 HR: Performed by: ORTHOPAEDIC SURGERY

## 2021-09-23 PROCEDURE — 77030019908 HC STETH ESOPH SIMS -A: Performed by: STUDENT IN AN ORGANIZED HEALTH CARE EDUCATION/TRAINING PROGRAM

## 2021-09-23 PROCEDURE — 77030040922 HC BLNKT HYPOTHRM STRY -A: Performed by: STUDENT IN AN ORGANIZED HEALTH CARE EDUCATION/TRAINING PROGRAM

## 2021-09-23 PROCEDURE — 76060000033 HC ANESTHESIA 1 TO 1.5 HR: Performed by: ORTHOPAEDIC SURGERY

## 2021-09-23 PROCEDURE — 77030000032 HC CUF TRNQT ZIMM -B: Performed by: ORTHOPAEDIC SURGERY

## 2021-09-23 PROCEDURE — 74011000250 HC RX REV CODE- 250: Performed by: STUDENT IN AN ORGANIZED HEALTH CARE EDUCATION/TRAINING PROGRAM

## 2021-09-23 PROCEDURE — 77030008825 HC WRE FIX K ZIMM -B: Performed by: ORTHOPAEDIC SURGERY

## 2021-09-23 PROCEDURE — 2709999900 HC NON-CHARGEABLE SUPPLY: Performed by: ORTHOPAEDIC SURGERY

## 2021-09-23 DEVICE — BONE SCREW, FULLY THREADED, T8
Type: IMPLANTABLE DEVICE | Site: FOOT | Status: FUNCTIONAL
Brand: VARIAX

## 2021-09-23 DEVICE — LOCKING SCREW, FULLY THREADED,T8
Type: IMPLANTABLE DEVICE | Site: FOOT | Status: FUNCTIONAL
Brand: VARIAX

## 2021-09-23 DEVICE — SLIM Y-PLATE
Type: IMPLANTABLE DEVICE | Site: FOOT | Status: FUNCTIONAL
Brand: VARIAX

## 2021-09-23 DEVICE — IMPLANTABLE DEVICE
Type: IMPLANTABLE DEVICE | Site: FOOT | Status: FUNCTIONAL
Brand: FUSEFORCE

## 2021-09-23 RX ORDER — MIDAZOLAM HYDROCHLORIDE 1 MG/ML
2 INJECTION, SOLUTION INTRAMUSCULAR; INTRAVENOUS ONCE
Status: COMPLETED | OUTPATIENT
Start: 2021-09-23 | End: 2021-09-23

## 2021-09-23 RX ORDER — SODIUM CHLORIDE, SODIUM LACTATE, POTASSIUM CHLORIDE, CALCIUM CHLORIDE 600; 310; 30; 20 MG/100ML; MG/100ML; MG/100ML; MG/100ML
150 INJECTION, SOLUTION INTRAVENOUS CONTINUOUS
Status: DISCONTINUED | OUTPATIENT
Start: 2021-09-23 | End: 2021-09-23 | Stop reason: HOSPADM

## 2021-09-23 RX ORDER — ONDANSETRON 2 MG/ML
INJECTION INTRAMUSCULAR; INTRAVENOUS AS NEEDED
Status: DISCONTINUED | OUTPATIENT
Start: 2021-09-23 | End: 2021-09-23 | Stop reason: HOSPADM

## 2021-09-23 RX ORDER — ACETAMINOPHEN 500 MG
1000 TABLET ORAL ONCE
Status: COMPLETED | OUTPATIENT
Start: 2021-09-23 | End: 2021-09-23

## 2021-09-23 RX ORDER — NALOXONE HYDROCHLORIDE 4 MG/.1ML
SPRAY NASAL
Qty: 2 EACH | Refills: 0 | Status: SHIPPED
Start: 2021-09-23 | End: 2022-04-15 | Stop reason: CLARIF

## 2021-09-23 RX ORDER — MIDAZOLAM HYDROCHLORIDE 1 MG/ML
INJECTION, SOLUTION INTRAMUSCULAR; INTRAVENOUS
Status: COMPLETED
Start: 2021-09-23 | End: 2021-09-23

## 2021-09-23 RX ORDER — HYDROCODONE BITARTRATE AND ACETAMINOPHEN 5; 325 MG/1; MG/1
1 TABLET ORAL AS NEEDED
Status: DISCONTINUED | OUTPATIENT
Start: 2021-09-23 | End: 2021-09-23 | Stop reason: HOSPADM

## 2021-09-23 RX ORDER — LIDOCAINE HYDROCHLORIDE 10 MG/ML
0.1 INJECTION INFILTRATION; PERINEURAL AS NEEDED
Status: DISCONTINUED | OUTPATIENT
Start: 2021-09-23 | End: 2021-09-23 | Stop reason: HOSPADM

## 2021-09-23 RX ORDER — FAMOTIDINE 20 MG/1
20 TABLET, FILM COATED ORAL ONCE
Status: COMPLETED | OUTPATIENT
Start: 2021-09-23 | End: 2021-09-23

## 2021-09-23 RX ORDER — SODIUM CHLORIDE 9 MG/ML
50 INJECTION, SOLUTION INTRAVENOUS CONTINUOUS
Status: DISCONTINUED | OUTPATIENT
Start: 2021-09-23 | End: 2021-09-23 | Stop reason: HOSPADM

## 2021-09-23 RX ORDER — SODIUM CHLORIDE 0.9 % (FLUSH) 0.9 %
5-40 SYRINGE (ML) INJECTION EVERY 8 HOURS
Status: DISCONTINUED | OUTPATIENT
Start: 2021-09-23 | End: 2021-09-23 | Stop reason: HOSPADM

## 2021-09-23 RX ORDER — CEFAZOLIN SODIUM/WATER 2 G/20 ML
2 SYRINGE (ML) INTRAVENOUS ONCE
Status: COMPLETED | OUTPATIENT
Start: 2021-09-23 | End: 2021-09-23

## 2021-09-23 RX ORDER — DEXAMETHASONE SODIUM PHOSPHATE 4 MG/ML
INJECTION, SOLUTION INTRA-ARTICULAR; INTRALESIONAL; INTRAMUSCULAR; INTRAVENOUS; SOFT TISSUE
Status: COMPLETED | OUTPATIENT
Start: 2021-09-23 | End: 2021-09-23

## 2021-09-23 RX ORDER — MIDAZOLAM HYDROCHLORIDE 1 MG/ML
2 INJECTION, SOLUTION INTRAMUSCULAR; INTRAVENOUS
Status: COMPLETED | OUTPATIENT
Start: 2021-09-23 | End: 2021-09-23

## 2021-09-23 RX ORDER — PROPOFOL 10 MG/ML
INJECTION, EMULSION INTRAVENOUS AS NEEDED
Status: DISCONTINUED | OUTPATIENT
Start: 2021-09-23 | End: 2021-09-23 | Stop reason: HOSPADM

## 2021-09-23 RX ORDER — DEXAMETHASONE SODIUM PHOSPHATE 4 MG/ML
INJECTION, SOLUTION INTRA-ARTICULAR; INTRALESIONAL; INTRAMUSCULAR; INTRAVENOUS; SOFT TISSUE AS NEEDED
Status: DISCONTINUED | OUTPATIENT
Start: 2021-09-23 | End: 2021-09-23 | Stop reason: HOSPADM

## 2021-09-23 RX ORDER — ACETAMINOPHEN 500 MG
1000 TABLET ORAL
Status: DISCONTINUED | OUTPATIENT
Start: 2021-09-23 | End: 2021-09-23 | Stop reason: HOSPADM

## 2021-09-23 RX ORDER — FENTANYL CITRATE 50 UG/ML
100 INJECTION, SOLUTION INTRAMUSCULAR; INTRAVENOUS ONCE
Status: COMPLETED | OUTPATIENT
Start: 2021-09-23 | End: 2021-09-23

## 2021-09-23 RX ORDER — SODIUM CHLORIDE 0.9 % (FLUSH) 0.9 %
5-40 SYRINGE (ML) INJECTION AS NEEDED
Status: DISCONTINUED | OUTPATIENT
Start: 2021-09-23 | End: 2021-09-23 | Stop reason: HOSPADM

## 2021-09-23 RX ORDER — OXYCODONE HYDROCHLORIDE 5 MG/1
5 TABLET ORAL
Qty: 20 TABLET | Refills: 0 | Status: SHIPPED | OUTPATIENT
Start: 2021-09-23 | End: 2021-09-27 | Stop reason: ALTCHOICE

## 2021-09-23 RX ORDER — LIDOCAINE HYDROCHLORIDE 20 MG/ML
INJECTION, SOLUTION EPIDURAL; INFILTRATION; INTRACAUDAL; PERINEURAL AS NEEDED
Status: DISCONTINUED | OUTPATIENT
Start: 2021-09-23 | End: 2021-09-23 | Stop reason: HOSPADM

## 2021-09-23 RX ORDER — CEPHALEXIN 500 MG/1
500 CAPSULE ORAL 4 TIMES DAILY
Qty: 12 CAPSULE | Refills: 0 | Status: SHIPPED
Start: 2021-09-23 | End: 2022-04-15 | Stop reason: CLARIF

## 2021-09-23 RX ORDER — HYDROMORPHONE HYDROCHLORIDE 2 MG/ML
0.5 INJECTION, SOLUTION INTRAMUSCULAR; INTRAVENOUS; SUBCUTANEOUS
Status: DISCONTINUED | OUTPATIENT
Start: 2021-09-23 | End: 2021-09-23 | Stop reason: HOSPADM

## 2021-09-23 RX ADMIN — DEXAMETHASONE SODIUM PHOSPHATE 4 MG: 4 INJECTION, SOLUTION INTRA-ARTICULAR; INTRALESIONAL; INTRAMUSCULAR; INTRAVENOUS; SOFT TISSUE at 08:31

## 2021-09-23 RX ADMIN — MIDAZOLAM HYDROCHLORIDE 2 MG: 1 INJECTION, SOLUTION INTRAMUSCULAR; INTRAVENOUS at 08:56

## 2021-09-23 RX ADMIN — ONDANSETRON 4 MG: 2 INJECTION INTRAMUSCULAR; INTRAVENOUS at 09:52

## 2021-09-23 RX ADMIN — MIDAZOLAM 2 MG: 1 INJECTION INTRAMUSCULAR; INTRAVENOUS at 08:45

## 2021-09-23 RX ADMIN — CEFAZOLIN 2 G: 1 INJECTION, POWDER, FOR SOLUTION INTRAVENOUS at 09:14

## 2021-09-23 RX ADMIN — DEXAMETHASONE SODIUM PHOSPHATE 10 MG: 4 INJECTION, SOLUTION INTRAMUSCULAR; INTRAVENOUS at 09:16

## 2021-09-23 RX ADMIN — MIDAZOLAM 2 MG: 1 INJECTION INTRAMUSCULAR; INTRAVENOUS at 08:56

## 2021-09-23 RX ADMIN — LIDOCAINE HYDROCHLORIDE 100 MG: 20 INJECTION, SOLUTION EPIDURAL; INFILTRATION; INTRACAUDAL; PERINEURAL at 09:11

## 2021-09-23 RX ADMIN — ROPIVACAINE HYDROCHLORIDE 30 ML: 5 INJECTION, SOLUTION EPIDURAL; INFILTRATION; PERINEURAL at 08:27

## 2021-09-23 RX ADMIN — FAMOTIDINE 20 MG: 20 TABLET, FILM COATED ORAL at 08:40

## 2021-09-23 RX ADMIN — SODIUM CHLORIDE, SODIUM LACTATE, POTASSIUM CHLORIDE, AND CALCIUM CHLORIDE 150 ML/HR: 600; 310; 30; 20 INJECTION, SOLUTION INTRAVENOUS at 08:52

## 2021-09-23 RX ADMIN — ACETAMINOPHEN 1000 MG: 500 TABLET ORAL at 08:40

## 2021-09-23 RX ADMIN — ROPIVACAINE HYDROCHLORIDE 20 ML: 5 INJECTION, SOLUTION EPIDURAL; INFILTRATION; PERINEURAL at 08:31

## 2021-09-23 RX ADMIN — PROPOFOL 150 MG: 10 INJECTION, EMULSION INTRAVENOUS at 09:11

## 2021-09-23 RX ADMIN — DEXAMETHASONE SODIUM PHOSPHATE 4 MG: 4 INJECTION, SOLUTION INTRA-ARTICULAR; INTRALESIONAL; INTRAMUSCULAR; INTRAVENOUS; SOFT TISSUE at 08:27

## 2021-09-23 RX ADMIN — FENTANYL CITRATE 100 MCG: 50 INJECTION INTRAMUSCULAR; INTRAVENOUS at 08:45

## 2021-09-23 NOTE — H&P
Outpatient Surgery History and Physical:  Parvin Jorgensen was seen and examined. CHIEF COMPLAINT:   Left foot. PE:   There were no vitals taken for this visit. Heart:   Regular rhythm      Lungs:  Are clear      Past Medical History:    Patient Active Problem List    Diagnosis    Chronic hepatitis C without hepatic coma ([facility] [address]     she completed treatment with Sovaldi/RBV x12 weeks around 08/10/16, was HCV RNA undetectable at [facility] on 08/15/16. She was an F0-F1 on Fibrosure.  Restless leg    Osteoporosis    Anxiety and depression    Chronic pain    Hyperlipidemia    Arthritis    Back pain    Insomnia    GERD (gastroesophageal reflux disease)    Nausea    Obesity    Carpal tunnel syndrome    Serotonin syndrome       Surgical History:   Past Surgical History:   Procedure Laterality Date    HX CARPAL TUNNEL RELEASE Bilateral     HX CYST REMOVAL      mouth    HX OTHER SURGICAL  11/6/98    Liver biopsy, chronic hepatitis with minimal periportal inflammation and minimal portal fibrosis     HX TONSIL AND ADENOIDECTOMY      HX TUBAL LIGATION         Social History: Patient  reports that she quit smoking about 9 years ago. She has a 25.00 pack-year smoking history. She has never used smokeless tobacco. She reports that she does not drink alcohol and does not use drugs.     Family History:   Family History   Problem Relation Age of Onset    Heart Disease Father     Lung Disease Father     Other Father         Bypass surgery, heart rhythm abnormality getting defibrillator     Cancer Maternal Grandmother         sinus    Cancer Maternal Grandfather         Sinus    Diabetes Paternal Grandmother     Cancer Paternal Grandmother         Sinus    Cancer Paternal Grandfather         Sinus    Diabetes Son        Allergies: Reviewed per EMR  Allergies   Allergen Reactions    Ambien [Zolpidem] Other (comments)     Nighttime eating with no memory      Chantix [Varenicline] Other (comments) \"make me crazy\"/homicidal-took it a couple of weeks    Other Medication Other (comments)     Most antidepressants- SSRIs- serotonin syndrome.  Prolia [Denosumab] Other (comments)     She had jaw pain, generalized symptomatology a fairly severe reaction.  Tegretol [Carbamazepine] Other (comments)     Rash generalized and itching      Trazodone Other (comments)     Serotonin syndrome       Medications:    No current facility-administered medications on file prior to encounter. Current Outpatient Medications on File Prior to Encounter   Medication Sig    gabapentin (NEURONTIN) 300 mg capsule Take 1 Capsule by mouth three (3) times daily.  ibuprofen (MOTRIN) 800 mg tablet Take 1 Tablet by mouth every eight (8) hours as needed for Pain.  cephALEXin (Keflex) 500 mg capsule Take 1 Capsule by mouth four (4) times daily.  baclofen (LIORESAL) 20 mg tablet two (2) times a day.  dextroamphetamine-amphetamine (ADDERALL) 30 mg tablet two (2) times a day.  FLUoxetine (PROzac) 20 mg capsule daily.  fluticasone propionate (FLONASE) 50 mcg/actuation nasal spray daily as needed.  furosemide (LASIX) 40 mg tablet 80 mg daily.  mirabegron ER (Myrbetriq) 50 mg ER tablet daily.  montelukast (SINGULAIR) 10 mg tablet daily.  tamsulosin (FLOMAX) 0.4 mg capsule daily.  buPROPion XL (WELLBUTRIN XL) 300 mg XL tablet Every morning.  buPROPion SR (Wellbutrin SR) 150 mg SR tablet nightly.  rOPINIRole (Requip) 1 mg tablet 1-2 at night    clonazePAM (KLONOPIN) 2 mg tablet Take 1 Tab by mouth two (2) times a day. Max Daily Amount: 4 mg.  suvorexant (BELSOMRA) 20 mg tablet Take 1 Tab by mouth nightly as needed for Insomnia. Max Daily Amount: 20 mg.    omeprazole (PRILOSEC) 20 mg capsule Take 1 Cap by mouth every morning.  raloxifene (EVISTA) 60 mg tablet Take 1 Tab by mouth daily.  ibuprofen (MOTRIN) 200 mg tablet Take  by mouth every six (6) hours as needed.     calcium-cholecalciferol, d3, (CALCIUM 600 + D) 600-125 mg-unit tab Take  by mouth daily.  magnesium oxide 500 mg tab Take 500 mg by mouth nightly. Or 250 twice daily. The surgery is planned for the LEFT REVISION CHEVRON BUNIONECTOMY W/ CALCANEAL AUTOGRAFT . History and physical has been reviewed. The patient has been examined. There have been no significant clinical changes since the completion of the originally dated History and Physical.  Patient identified by surgeon; surgical site was confirmed by patient and surgeon. The patient is here today for outpatient surgery. I have examined the patient, no changes are noted in the patient's medical status. Necessity for the procedure/care is still present and the history and physical above is current. See the office notes for the full long term history of the problem. Please see the recent office notes for the musculoskeletal examination.     Signed By: Karly Kang NP     September 23, 2021 8:16 AM

## 2021-09-23 NOTE — ANESTHESIA PREPROCEDURE EVALUATION
Anesthetic History   No history of anesthetic complications            Review of Systems / Medical History  Patient summary reviewed    Pulmonary          Smoker (quit 5 years)         Neuro/Psych         Psychiatric history     Cardiovascular                  Exercise tolerance: <4 METS: Due to back      GI/Hepatic/Renal     GERD: well controlled      Liver disease     Endo/Other        Arthritis     Other Findings   Comments: Hx serotonin syndrome    Chronic back pain on chronic opioids    Cocaine abuse in the past, states none in 14 years           Physical Exam    Airway  Mallampati: II  TM Distance: > 6 cm  Neck ROM: normal range of motion   Mouth opening: Normal     Cardiovascular  Regular rate and rhythm,  S1 and S2 normal,  no murmur, click, rub, or gallop  Rhythm: regular  Rate: normal         Dental    Dentition: Upper partial plate and Lower dentition intact     Pulmonary  Breath sounds clear to auscultation               Abdominal         Other Findings            Anesthetic Plan    ASA: 2  Anesthesia type: total IV anesthesia and general - backup      Post-op pain plan if not by surgeon: peripheral nerve block single    Induction: Intravenous  Anesthetic plan and risks discussed with: Patient

## 2021-09-23 NOTE — ANESTHESIA PROCEDURE NOTES
Peripheral Block    Start time: 9/23/2021 8:27 AM  End time: 9/23/2021 8:31 AM  Performed by: Dylan Mcpherson MD  Authorized by: Dylan Mcpherson MD       Pre-procedure: Indications: at surgeon's request and post-op pain management    Preanesthetic Checklist: patient identified, risks and benefits discussed, site marked, timeout performed, anesthesia consent given and patient being monitored    Timeout Time: 08:27 EDT          Block Type:   Block Type: Adductor canal  Laterality:  Left  Monitoring:  Standard ASA monitoring, responsive to questions, oxygen, continuous pulse ox, frequent vital sign checks and heart rate  Injection Technique:  Single shot  Procedures: ultrasound guided    Patient Position: supine  Prep: chlorhexidine    Location:  Mid thigh  Needle Type:  Stimuplex  Needle Gauge:  22 G  Needle Localization:  Ultrasound guidance  Medication Injected:  Ropivacaine 0.5% with epinephrine 1:200,000 injection, 20 mL (Mixture components: EPINEPHrine HCl (PF) 1 mg/mL (1 mL) Soln, . 005 mL; ropivacaine (PF) 5 mg/mL (0.5 %) Soln, 1 mL)  dexamethasone (DECADRON) 4 mg/mL injection, 4 mg  Med Admin Time: 9/23/2021 8:31 AM    Assessment:  Number of attempts:  1  Injection Assessment:  Incremental injection every 5 mL, local visualized surrounding nerve on ultrasound, negative aspiration for blood, no intravascular symptoms, no paresthesia and ultrasound image on chart  Patient tolerance:  Patient tolerated the procedure well with no immediate complications  All needles out intact, proc. Tolerated well.

## 2021-09-23 NOTE — BRIEF OP NOTE
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 Procedure(s):  LEFT REVISION CHEVRON/raffi  BUNIONECTOMY W/ CALCANEAL AUTOGRAFT     Surgeon(s):  Scott Jovel MD    Surgical Assistant: None    Anesthesia: General     Estimated Blood Loss (mL): Minimal    Complications: None    Specimens: * No specimens in log *     Implants:   Implant Name Type Inv.  Item Serial No.  Lot No. LRB No. Used Action   PLATE BNE SLIM T8 Y PLT 5 H - [id number]  PLATE BNE SLIM T8 Y PLT 5 H  [id number] Left 1 Implanted   SCR BNE LCK FT 2.7X18MM T8 -- [employer] - [id number]  SCR BNE LCK FT 2.7X18MM T8 -- [employer]IA[employer]  [employer] [id number] Left 3 Implanted   SCR BNE FT 2.7X20MM T8 -- [employer] - [id number]  SCR BNE FT 2.7X20MM T8 -- [employer]  [employer] [id number] Left 1 Implanted   SCR BNE FT 2.7X28MM T8 -- [employer] - [id number]  SCR BNE FT 2.7X28MM T8 -- [employer]IA[employer]  [employer] [id number] Left 2 Implanted   STAPLE BNE FIX [id number] NIT - [id number]  STAPLE BNE FIX [id number] NIT  [facility] [employer] [id number] Left 1 Implanted       Drains: * No LDAs found *    Findings:     Electronically Signed by Antonieta Garcia MD on 9/23/2021 at 10:06 AM

## 2021-09-23 NOTE — ANESTHESIA PROCEDURE NOTES
Peripheral Block    Start time: 9/23/2021 8:21 AM  End time: 9/23/2021 8:27 AM  Performed by: Dylan Mcpherson MD  Authorized by: Dylan Mcpherson MD       Pre-procedure: Indications: at surgeon's request and post-op pain management    Preanesthetic Checklist: patient identified, risks and benefits discussed, site marked, timeout performed, anesthesia consent given and patient being monitored    Timeout Time: 08:21 EDT          Block Type:   Block Type:  Popliteal  Laterality:  Left  Monitoring:  Standard ASA monitoring, responsive to questions, oxygen, continuous pulse ox, frequent vital sign checks and heart rate  Injection Technique:  Single shot  Procedures: ultrasound guided and nerve stimulator    Patient position: lateral decubitus. Prep: chlorhexidine    Location:  Mid thigh  Needle Type:  Stimuplex  Needle Gauge:  22 G  Needle Localization:  Nerve stimulator and ultrasound guidance  Motor Response comment:   Motor Response: minimal motor response >0.4 mA   Medication Injected:  Ropivacaine 0.375% with epi 1:200,000 in NS injection, 30 mL (Mixture components: ropivacaine (PF) 5 mg/mL (0.5 %) Soln, . 75 mL; EPINEPHrine HCl (PF) 1 mg/mL (1 mL) Soln, . 005 mL; 0.9% sodium chloride Soln, .245 mL)  dexamethasone (DECADRON) 4 mg/mL injection, 4 mg  Med Admin Time: 9/23/2021 8:27 AM    Assessment:  Number of attempts:  1  Injection Assessment:  Incremental injection every 5 mL, local visualized surrounding nerve on ultrasound, negative aspiration for blood, no intravascular symptoms, no paresthesia and ultrasound image on chart  Patient tolerance:  Patient tolerated the procedure well with no immediate complications  All needles out intact, proc. Tolerated well.

## 2021-09-23 NOTE — DISCHARGE INSTRUCTIONS
INSTRUCTIONS FOLLOWING FOOT SURGERY    ACTIVITY  Elevate foot. No Ice    No weight bearing. Use crutches or knee walker until seen in follow up appointment  Don't put anything into the splint to relieve itching etc.     Protected partial weight bearing on the heel only as tolerated in post op shoe after full sensation returns. Let the office know if dressing gets saturated with water . Blood clot prevention:  As instructed by Dr Darcie Rodriguez: Take one 325mg aspirin daily if okay with your medical doctor and you have no GI ulcer. Get up and out of bed frequently. While in bed move the legs as much as possible)    DRESSING CARE Keep dry and in place until follow up appointment. Cover with cast bag or plastic bag when showering. CALL YOUR DOCTOR IF YOU HAVE  Excessive bleeding that does not stop after holding mild pressure over the area. Temperature of 101 degrees or above. Redness, excessive swelling or bruising, and/or green or yellow, smelly discharge from incision. Loss of sensation - cold, white or blue toes. DIET  Day of Surgery: Clear liquids until no nausea or vomiting; then light, bland diet (Baked chicken, plain rice, grits, scrambled egg, toast). Nothing Greasy, fried or spicy today  Advance to regular diet on second day, unless your doctor orders otherwise. PAIN  Take pain medications as directed by your doctor. Call your doctor if pain is NOT relieved by medication. MEDICATION INTERACTION:  During your procedure you potentially received a medication or medications which may reduce the effectiveness of oral contraceptives. Please consider other forms of contraception for 1 month following your procedure if you are currently using oral contraceptives as your primary form of birth control.  In addition to this, we recommend continuing your oral contraceptive as prescribed, unless otherwise instructed by your physician, during this time    After general anesthesia or intravenous sedation, for 24 hours or while taking prescription Narcotics:  · Limit your activities  · A responsible adult needs to be with you for the next 24 hours  · Do not drive and operate hazardous machinery  · Do not make important personal or business decisions  · Do not drink alcoholic beverages  · If you have not urinated within 8 hours after discharge, and you are experiencing discomfort from urinary retention, please go to the nearest ED. · If you have sleep apnea and have a CPAP machine, please use it for all naps and sleeping. · Please use caution when taking narcotics and any of your home medications that may cause drowsiness. *  Please give a list of your current medications to your Primary Care Provider. *  Please update this list whenever your medications are discontinued, doses are      changed, or new medications (including over-the-counter products) are added. *  Please carry medication information at all times in case of emergency situations. These are general instructions for a healthy lifestyle:  No smoking/ No tobacco products/ Avoid exposure to second hand smoke  Surgeon General's Warning:  Quitting smoking now greatly reduces serious risk to your health. Obesity, smoking, and sedentary lifestyle greatly increases your risk for illness  A healthy diet, regular physical exercise & weight monitoring are important for maintaining a healthy lifestyle    Learning About How to Use Crutches  Your Care Instructions  Crutches can help you walk when you have an injured hip, leg, knee, ankle, or foot. Your doctor will tell you how much weight--if any--you can put on your leg. Be sure your crutches fit you. When you stand up in your normal posture, there should be space for two or three fingers between the top of the crutch and your armpit. When you let your hands hang down, the hand  should be at your wrists. When you put your hands on the hand , your elbows should be slightly bent.   To stay safe when using crutches:  · Look straight ahead, not down at your feet. · Clear away small rugs, cords, or anything else that could cause you to trip, slip, or fall. · Be very careful around pets and small children. They can get in your path when you least expect it. · Be sure the rubber tips on your crutches are clean and in good condition to help prevent slipping. · Avoid slick conditions, such as wet floors and snowy or icy driveways. In bad weather, be especially careful on curbs and steps. How to use crutches  Getting ready to walk    1. Bend your elbows slightly. Press the padded top parts of the crutches against your sides, under your armpits. 2. If you have been told not to put any weight on your injured leg, keep that leg bent and off the ground. Walking with crutches    1. Put both crutches about 12 inches in front of you. 2. Put your weight on the handgrips, not on the pads under your arms. (Constant pressure against your underarms can cause numbness.) Swing your body forward. (If you have been told not to put any weight on your injured leg, keep that leg bent and off the ground.)  3. To complete the step, put your weight on the strong leg. 4. Move your crutches about 12 inches in front of you, and start the next step. 5. When you're confident using the crutches, you can move the crutches and your injured leg at the same time. Then push straight down on the crutches as you step past the crutches with your strong leg, as you would in normal walking. 6. Take small steps. 7. Use ramps and elevators when you can. Sitting down    1. To sit, back up to the chair. Use one hand to hold both crutches by the handgrips, beside your injured leg. With the other hand, hold onto the seat and slowly lower yourself onto the chair. 2. Lay the crutches on the ground near your chair. If you prop them up, they may fall over. Getting up from a chair    1.  To get up from a chair,  the crutches and put them in one hand beside your injured leg. 2. Put your weight on the handgrips of the crutches and on your strong leg to stand up. Walking up stairs    1. To go up stairs, step up with your strong leg and then bring the crutches and your injured leg to the upper step. 2. For stairs that have handrails: Put both crutches under the arm opposite the handrail. Use the hand opposite the handrail to hold both crutches by the handgrips. 3. Hold onto the handrail as you go up. Put your strong leg on the step first when you go up. Walking down stairs    1. To go down stairs, put your crutches and injured leg on the lower step. 2. Bring your strong leg to the lower step. This saying may help you remember: \"Up with the good, down with the bad. \"  3. For stairs that have handrails: Put both crutches under the arm opposite the handrail. Use the hand opposite the handrail to hold both crutches by the handgrips. Hold onto the handrail as you go down. Follow the same process you use for stairs: Lead with your crutches and injured leg on the way down.

## 2021-09-23 NOTE — OP NOTES
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 Left revision double level bunionectomy, 77460                            2.  Left calcaneal autograft harvest, 20900    SURGEON: Annabelle Cates MD    ASSISTANT: Bhavana Cheatham NP  An assistant was required for positioning, retraction, and wound closure for this procedure. HARDWARE:   Implant Name Type Inv. Item Serial No.  Lot No. LRB No. Used Action   PLATE BNE SLIM T8 Y PLT 5 H - [id number]  PLATE BNE SLIM T8 Y PLT 5 H  [employer]_ [id number] Left 1 Implanted   SCR BNE LCK FT 2.7X18MM T8 -- [employer] - [id number]  SCR BNE LCK FT 2.7X18MM T8 -- [employer]  [employer]_ [id number] Left 3 Implanted   SCR BNE FT 2.7X20MM T8 -- [employer] - [id number]  SCR BNE FT 2.7X20MM T8 -- [employer]  [employer]_ [id number] Left 1 Implanted   SCR BNE FT 2.7X28MM T8 -- [employer] - [id number]  SCR BNE FT 2.7X28MM T8 -- [employer]  [employer]_ [id number] Left 2 Implanted   STAPLE BNE FIX [id number] NIT - [id number]  STAPLE BNE FIX [id number] NIT  [employer]WD [id number] Left 1 Implanted     INDICATIONS: This patient is a 61y.o. 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  Risks and benefits of the procedure including, but not limited to, anesthetic complications as well as surgical complications including damage to nerves and blood vessels, risk of infection, risk of incomplete pain relief, risk of malunion, nonunion and need for additional surgery have been discussed with the patient who wishes to proceed. PROCEDURE IN DETAIL: A time out was done to confirm the operating procedure, surgeon, patient and site. A block was placed by the department of anesthesia. In a preop surgical timeout the left lower extremity was identified as the correct surgical site and prepped and draped in a standard sterile fashions and ChloraPrep solution. Lateral approach the heel was an open. A 7 mm bone graft harvester was introduced for 5 cc of cancellous graft was obtained from the calcaneus. This was later used in the osteotomy site. This wound was irrigated and closed using nylon sutures. A medial approach the first MTP joint was an open and carried at the level of tarsometatarsal joint. A capsulotomy was performed. The 2 previous screw used across the metatarsal osteotomy removed as well as the screw from the proximal phalanx. The nonunion site was then identified and was freed up using a freer elevator. Fibrous material was removed using a rongeur. Both sides of the osteotomy were freshened using a drill bit and a cancellous graft was then placed into the osteotomy site. The osteotomy was reduced in the desired anatomic alignment and pinned using a K wire. A dorsal locking plate was affixed to the first metatarsal using proper length locking and nonlocking screws. This was placed across the first tarsometatarsal joint into the medial cuneiforms for additional fixation. An Reginald osteotomy was performed the proximal phalanx and secured using a compression staple. All hardware was noted to be adequately placed on image intensification. The wounds and irrigated and closed using Monocryl in the capsule followed by Monocryl nylon sutures on the skin. Sterile dressings and applied. Anesthesia was discontinued. The patient was transferred back to recovery bed. She was taken recovery in satisfactory condition. She appeared to tolerate the procedure well.   There were no apparent trouble or anesthetic complications. All needle and sponge counts were correct. TOURNIQUET TIME: Approx 39 minutes. SPECIMENS: none    ESTIMATED BLOOD LOSS: min mL.

## 2021-09-27 NOTE — ANESTHESIA POSTPROCEDURE EVALUATION
Procedure(s):  LEFT REVISION CHEVRON BUNIONECTOMY W/ CALCANEAL AUTOGRAFT .    total IV anesthesia, general - backup    Anesthesia Post Evaluation      Multimodal analgesia: multimodal analgesia used between 6 hours prior to anesthesia start to PACU discharge  Patient location during evaluation: bedside  Patient participation: complete - patient participated  Level of consciousness: awake and alert  Pain management: adequate  Airway patency: patent  Anesthetic complications: no  Cardiovascular status: hemodynamically stable  Respiratory status: spontaneous ventilation  Hydration status: euvolemic  Comments: Patient stable and may discharge at this time.   Post anesthesia nausea and vomiting:  none  Final Post Anesthesia Temperature Assessment:  Normothermia (36.0-37.5 degrees C)      INITIAL Post-op Vital signs:   Vitals Value Taken Time   /72 09/23/21 1102   Temp 36.6 °C (97.8 °F) 09/23/21 1102   Pulse 74 09/23/21 1102   Resp 18 09/23/21 1102   SpO2 97 % 09/23/21 1102

## 2021-11-23 ENCOUNTER — HOSPITAL ENCOUNTER (OUTPATIENT)
Dept: PHYSICAL THERAPY | Age: 56
Discharge: HOME | End: 2021-11-23
Payer: MEDICARE

## 2021-11-23 DIAGNOSIS — T84.84XA PAINFUL ORTHOPAEDIC HARDWARE (HCC): ICD-10-CM

## 2021-11-23 DIAGNOSIS — M25.572 LEFT ANKLE PAIN, UNSPECIFIED CHRONICITY: ICD-10-CM

## 2021-11-23 PROCEDURE — 97161 PT EVAL LOW COMPLEX 20 MIN: CPT

## 2021-11-23 PROCEDURE — 97140 MANUAL THERAPY 1/> REGIONS: CPT

## 2021-11-23 NOTE — PROGRESS NOTES
Gabriel Osiel  : 1965  Primary: Lorri Montgomeryserina [employer] Choice *  Secondary:  [address] at [facility]  [address] [place], [address] [place] [address], [facility], [address] [place] [address]  [phone]   [phone]      OUTPATIENT PHYSICAL THERAPY: Daily Treatment Note 2021    ICD-10: Treatment Diagnosis: Painful orthopaedic hardware [facility]) [M22.77OD]               Treatment Diagnosis 2: Left ankle pain, unspecified chronicity [M25.572]               Treatment Diagnosis 3: Other abnormalities of gait and mobility [R26.89]   Precautions: FALL RISK  Allergies: Ambien [zolpidem], Chantix [varenicline], Other medication, Prolia [denosumab], Tegretol [carbamazepine], and Trazodone   TREATMENT PLAN:  Effective Dates: 2021 TO 2022 (60 days). Frequency/Duration: 1-2 times a week for 60 Day(s) MEDICAL/REFERRING DIAGNOSIS:  Painful orthopaedic hardware [facility]) Vinay Vernon  Left ankle pain, unspecified chronicity [M25.572]   DATE OF ONSET: 2021: left revision double level bunionectomy, left calcaneal autograft harvest.                              2021: 2nd/3rd IP joint fusion and bunionectomy  REFERRING PHYSICIAN: Hay Barber, *  MD Orders: Evaluate and Treat  Return MD Appointment: 2021      Pre-treatment Symptoms/Complaints: Patient stating that doing her exercises is difficult because she is already in pain from her daily required activities. Pain: Initial: Pain Intensity 1: 5  Pain Location 1: Foot, Toe (comment which one) (Great)  Pain Orientation 1: Left, Right  Post Session:  5/10   Medications Last Reviewed:  2021  Updated Objective Findings:  See evaluation note from today   TREATMENT:   THERAPEUTIC EXERCISE: (0 minutes none today):  Exercises per grid below to improve mobility, strength, balance and coordination.   Required mod visual, verbal, manual and tactile cues to promote proper body alignment, promote proper body posture, promote proper body mechanics and promote proper body breathing techniques. Progressed resistance, range, repetitions and complexity of movement as indicated. Date:  11/23/2021 Date:   Date:     Activity/Exercise Parameters Parameters Parameters   Ankle Circles NEXT SESSION     Ankle Dorsiflexion/Plantarflexion NEXT SESSION     Ankle Inversion/Eversion NEXT SESSION     Seated Calf Stretch  NEXT SESSION                          Time spent with patient reviewing proper muscle recruitment and technique with exercises. MANUAL THERAPY: (10 minutes): Soft tissue mobilization was utilized and necessary because of the patient's restricted motion of soft tissue   Soft tissue mobilization to gastrocnemius, soleus and peroneals on the L to decrease pain and tightness. MODALITIES: (0 minutes):      None today     HEP: As above; handouts given to patient for all exercises. Treatment/Session Summary:    · Response to Treatment:  Patient reported feeling okay following session. No change in pain. .  · Baseline vitals (11/23/2021): BP: 100/59mmHg, HR: 67, SpO: 98  · Communication/Consultation:  Education on exam findings, plan of care, rationale for treatment. · Equipment provided today:  None today  · Recommendations/Intent for next treatment session: Next visit will focus on manual therapy, exercises as tolerated.      Total Treatment Billable Duration:  30 minutes: 20 evaluation, 10 manual therapy   PT Patient Time In/Time Out  Time In: 1400  Time Out: [address], PT

## 2021-11-23 NOTE — THERAPY EVALUATION
Pedro Scale  : 1965  Primary: Donte Livingston Medicare Choice *  Secondary:  [facility] [place] [address] [place], [address], [place], [address] [place] [address]  [phone]   [phone]       OUTPATIENT PHYSICAL THERAPY:Initial Assessment 2021    ICD-10: Treatment Diagnosis: Painful orthopaedic hardware [facility]) [X60.01TP]               Treatment Diagnosis 2: Left ankle pain, unspecified chronicity [M25.572]               Treatment Diagnosis 3: Other abnormalities of gait and mobility [R26.89]   Precautions: FALL RISK  Allergies: Ambien [zolpidem], Chantix [varenicline], Other medication, Prolia [denosumab], Tegretol [carbamazepine], and Trazodone   TREATMENT PLAN:  Effective Dates: 2021 TO 2022 (60 days). Frequency/Duration: 1-2 times a week for 60 Day(s) MEDICAL/REFERRING DIAGNOSIS:  Painful orthopaedic hardware [facility]  Left ankle pain, unspecified chronicity [M25.572]   DATE OF ONSET: 2021: left revision double level bunionectomy, left calcaneal autograft harvest.                              2021: 2nd/3rd IP joint fusion and bunionectomy  REFERRING PHYSICIAN: Mary Cutler, *  MD Orders: Evaluate and Treat  Return MD Appointment: 2021      INITIAL ASSESSMENT:  Ms. Chuck Bauman is a 61 y.o. female presenting to physical therapy with complaints of debilitating L ankle and foot pain that has continued after having surgery in 2021 and then again in 2021. She reports the first two weeks after the last surgery she felt really good but then started to feel worse as part of the hardware started to loosen. She states that after having that part of the hardware removed 10/25/2021 in office her pain has worsened and her ankle has been swollen throughout the entire process. She came for physical therapy for previous episode of care which she reports was not helpful for managing her pain.  She reports being frustrated and discouraged by her continued severe pain and functional limitations and is concerned that the pain will never resolve. She is currently wearing an ankle brace for stability and using a straight cane for ambulation. She reports having three falls over the last few months and that she staggered backwards and her back has been hurting ever since. Patient presents with increased pain, decreased strength, decreased ROM, decreased flexibility, impaired gait, impaired transfer ability, decreased activity tolerance, and overall impaired functional mobility. Patient is a good candidate for skilled physical therapy interventions to include manual therapy, therapeutic exercise, balance training, gait training, transfer training, postural re-education, body mechanics training, and pain modalities as needed. PROBLEM LIST (Impacting functional limitations):  1. Decreased Strength  2. Decreased ADL/Functional Activities  3. Decreased Transfer Abilities  4. Decreased Ambulation Ability/Technique  5. Decreased Balance  6. Increased Pain  7. Decreased Activity Tolerance  8. Increased Fatigue  9. Decreased Flexibility/Joint Mobility  10. Edema/Girth  11. Decreased Skin Integrity/Hygeine  12. Decreased Stearns with Home Exercise Program INTERVENTIONS PLANNED: (Treatment may consist of any combination of the following)  1. Balance Exercise  2. Cold  3. Cryotherapy  4. Family Education  5. Gait Training  6. Heat  7. Home Exercise Program (HEP)  8. Manual Therapy  9. Neuromuscular Re-education/Strengthening  10. Range of Motion (ROM)  11. Therapeutic Activites  12. Therapeutic Exercise/Strengthening  13. Transfer Training     GOALS: (Goals have been discussed and agreed upon with patient.)  Short-Term Functional Goals: Time Frame: 11/23/2021 to 12/23/2021  1.  Patient demonstrates independence with home exercise program without verbal cueing provided by therapist.   2. Patient will report no more than 5/10 ankle pain at rest in order to demonstrate improved self pain control and tolerance. 3. Patient will ambulate her required distances safely with straight cane and step through gait pattern. Discharge Goals: Time Frame: 11/23/2021 to 01/23/2021  1. Patient will walk her required distances with minimal pain and no evidence of imbalance without assistive device. 2. Patient will negotiate steps reciprocally with one hand rail safely and with minimal pain. 3. Patient will demonstrate L ankle ROM equal to the R to facilitate more normalized gait pattern and decrease pain. 4. Patient will demonstrate L ankle strength 4/5 or better in all planes. 5. Patient will improve Foot and Ankle Ability Measure score to 80/116 from 36/116. Outcome Measure: Tool Used: PT/OT FOOT AND ANKLE ABILITY MEASURE  Score:  Initial: 36/116 Most Recent: X (Date: -- )   Interpretation of Score: For the \"Activities of Daily Living\", there are 21 questions each scored on a 5 point scale with 0 representing \"Unable to do\" and 4 representing \"No difficulty\". The lower the score, the greater the functional disability. 84/84 represents no disability. Minimal detectable change is 5.7 points. With the addition of the 8 questions in the \"Sports Subscale,\" there are 29 questions, each scored on a 5 point scale with 0 representing \"Unable to do\" and 4 representing \"No difficulty\". The lower the score, the greater the functional disability. 116/116 represents no disability. Minimal detectable change is 12.3 points. Medical Necessity:   · Patient is expected to demonstrate progress in strength, range of motion, balance, coordination and functional technique to decrease pain and improve safety during functional mobility and ADLs. · Skilled intervention continues to be required due to need for exercise progressions and manual therapy tailored to patient needs. .  Reason for Services/Other Comments:  · Patient continues to require skilled intervention due to need for plan of care modifications and exceptions tailored to patient goals and impairments . Total Evaluation Duration: 20 minutes    Rehabilitation Potential For Stated Goals: Good  Regarding Dianna Levi's therapy, I certify that the treatment plan above will be carried out by a therapist or under their direction. Thank you for this referral,  Magnolia Renee, PT    Referring Physician Signature: Jamie Tran, Indira Foots, * _______________________________ Date _____________             PAIN/SUBJECTIVE:    Initial: Pain Intensity 1: 5  Pain Location 1: Foot, Toe (comment which one) (Great)  Pain Orientation 1: Left, Right  Post Session: 5/10    HISTORY:    History of Injury/Illness (Reason for Referral):  Ms. Mike Liao is a 61 y.o. female presenting to physical therapy with complaints of debilitating L ankle and foot pain that has continued after having surgery in June 2021 and then again in September 2021. She reports the first two weeks after the last surgery she felt really good but then started to feel worse as part of the hardware started to loosen. She states that after having that part of the hardware removed 10/25/2021 in office her pain has worsened and her ankle has been swollen throughout the entire process. She came for physical therapy for previous episode of care which she reports was not helpful for managing her pain. She reports being frustrated and discouraged by her continued severe pain and functional limitations and is concerned that the pain will never resolve. She is currently wearing an ankle brace for stability and using a straight cane for ambulation. She reports having three falls over the last few months and that she staggered backwards and her back has been hurting ever since.  Patient presents with increased pain, decreased strength, decreased ROM, decreased flexibility, impaired gait, impaired transfer ability, decreased activity tolerance, and overall impaired functional mobility. Patient is a good candidate for skilled physical therapy interventions to include manual therapy, therapeutic exercise, balance training, gait training, transfer training, postural re-education, body mechanics training, and pain modalities as needed. Past Medical History/Comorbidities:   Ms. Uli Barnhart  has a past medical history of Allergic dermatitis, Alterations of sensations, Anxiety, Anxiety and depression (7/21/2016), Arthritis, Back pain (7/21/2016), Carpal tunnel syndrome (7/21/2016), Chronic hepatitis C without hepatic coma ([facility] [address] (7/21/2016), Chronic pain (7/21/2016), Contact dermatitis, COVID-19 vaccine series completed (05/05/2021), CTS (carpal tunnel syndrome), Depression, Fatigue (7/21/2016), GERD (gastroesophageal reflux disease), Hep C w/ coma, chronic, Hot flashes, Hyperlipidemia (7/21/2016), Insomnia (7/21/2016), Lymphadenitis, Menopause (7/21/2016), Nausea (7/21/2016), Neck pain (7/21/2016), Obesity (7/21/2016), Orbital osteo-periostitis, Osteoporosis (7/21/2016), Plantar fasciitis, Postmenopausal disorder, Psychiatric disorder, Reactive hypoglycemia, Restless leg (7/21/2016), Serotonin syndrome, Thrush, Tremor, Weakness, and Xerostomia. Ms. Uli Barnhart  has a past surgical history that includes hx tubal ligation; hx cyst removal; hx tonsil and adenoidectomy; hx other surgical (11/6/98); and hx carpal tunnel release (Bilateral). Social History/Living Environment:    Patient lives alone in a home with 20 steps. Prior Level of Function/Work/Activity:  Patient is retired.    Dominant Side:         RIGHT    Ambulatory/Rehab Services H2 Model Falls Risk Assessment    Risk Factors:       (2)  Symptomatic Depression       (1)  Any administered benzodiazepines       (5)  History of Recent Falls [w/in 3 months] Ability to Rise from Chair:       (1)  Pushes up, successful in one attempt    Falls Prevention Plan:       Exercise/Equipment Adaptation (specify):  close guarding for all standing exercises and mobility    Total: (5 or greater = High Risk): 8    ©2010 [facility] [employer]. All Rights Reserved. [facility] States Patent #[id number]. Federal Law prohibits the replication, distribution or use without written permission from [facility] [employer]    Current Medications:        Current Outpatient Medications:     methylPREDNISolone (MEDROL DOSEPACK) 4 mg tablet, Sig as directed, Disp: 1 Dose Pack, Rfl: 0    meloxicam (MOBIC) 15 mg tablet, Take 1 Tablet by mouth daily. , Disp: 30 Tablet, Rfl: 1    cephALEXin (KEFLEX) 500 mg capsule, Take 1 Capsule by mouth four (4) times daily. , Disp: 12 Capsule, Rfl: 0    naloxone (NARCAN) 4 mg/actuation nasal spray, Use 1 spray intranasally, then discard. Repeat with new spray every 2 min as needed for opioid overdose symptoms, alternating nostrils. , Disp: 2 Each, Rfl: 0    gabapentin (NEURONTIN) 300 mg capsule, Take 1 Capsule by mouth three (3) times daily. (Patient not taking: Reported on 9/23/2021), Disp: 90 Capsule, Rfl: 2    baclofen (LIORESAL) 20 mg tablet, two (2) times a day., Disp: , Rfl:     dextroamphetamine-amphetamine (ADDERALL) 30 mg tablet, two (2) times a day., Disp: , Rfl:     FLUoxetine (PROzac) 20 mg capsule, daily. , Disp: , Rfl:     fluticasone propionate (FLONASE) 50 mcg/actuation nasal spray, daily as needed. , Disp: , Rfl:     furosemide (LASIX) 40 mg tablet, 80 mg daily. , Disp: , Rfl:     mirabegron ER (Myrbetriq) 50 mg ER tablet, daily. , Disp: , Rfl:     montelukast (SINGULAIR) 10 mg tablet, daily. , Disp: , Rfl:     tamsulosin (FLOMAX) 0.4 mg capsule, daily. (Patient not taking: Reported on 9/23/2021), Disp: , Rfl:     buPROPion XL (WELLBUTRIN XL) 300 mg XL tablet, Every morning., Disp: , Rfl:     buPROPion SR (Wellbutrin SR) 150 mg SR tablet, nightly., Disp: , Rfl:     rOPINIRole (Requip) 1 mg tablet, 1-2 at night, Disp: , Rfl:     clonazePAM (KLONOPIN) 2 mg tablet, Take 1 Tab by mouth two (2) times a day.  Max Daily Amount: 4 mg., Disp: 60 Tab, Rfl: 2    suvorexant (BELSOMRA) 20 mg tablet, Take 1 Tab by mouth nightly as needed for Insomnia. Max Daily Amount: 20 mg., Disp: 30 Tab, Rfl: 2    omeprazole (PRILOSEC) 20 mg capsule, Take 1 Cap by mouth every morning., Disp: 90 Cap, Rfl: 3    raloxifene (EVISTA) 60 mg tablet, Take 1 Tab by mouth daily. , Disp: 90 Tab, Rfl: 3    calcium-cholecalciferol, d3, (CALCIUM 600 + D) 600-125 mg-unit tab, Take  by mouth daily. , Disp: , Rfl:     magnesium oxide 500 mg tab, Take 500 mg by mouth nightly. Or 250 twice daily. , Disp: , Rfl:      Patient stating she will bring full list of current medications next session as she is no longer taking some of the above medications. Date Last Reviewed:  11/23/2021    Number of Personal Factors/Comorbidities that affect the Plan of Care:   0: LOW COMPLEXITY    EXAMINATION:    Patient denies any LE paresthesia. Patient denies any increase of symptoms with cough, sneeze or valsalva. Patient denies any saddle paresthesia or bowel/bladder deficits. Observation/Orthostatic Postural Assessment:          Patient demonstrating antalgic posture in static standing; presents with ankle brace today. L ankle and foot appear grossly     Palpation:          Patient is tender to palpation around dorsum of L foot; peroneal tendons, tibialis posterior tendons. ROM:    AROM/ PROM Left (degrees) Right (degrees)   Plantarflexion (PF) 30 40   Dorsiflexion (DF) 5 5   Inversion 15 25   Eversion 0 0   Great toe extension NT NT   Great toe flexion NT NT     Strength: Motion Tested Left   (*/5) Right  (*/5)   Dorsiflexion 4 5   Plantarflexion 3 3   Inversion 3 3   Eversion 3 3   Great Toe Extension NT NT   Great Toe Flexion NT NT     Special Tests:          None performed due to nature of surgeries. Passive Accessory Motion: To be tested at next visit due to limited time this session.      Neurological Screen:              Myotomes: Key muscle strength testing through bilateral LE is intact; weakness does not follow myotome pattern. Dermatomes: Sensation to light touch for bilateral LE is intact except for areas over incisions slightly impaired. Functional Mobility:   Patient ambulates with straight cane; evidence of instability with decreased stance time and weight bearing on the L. Performs transfers with difficulty using BUEs. Balance:          Impaired based on observation and patient history; patient demonstrating evidence of imbalance when walking with straight cane; staggered backward and required assistance to prevent fall during exam.       Body Structures Involved:  1. Nerves  2. Bones  3. Joints  4. Muscles  5. Ligaments Body Functions Affected:  1. Sensory/Pain  2. Neuromusculoskeletal  3. Movement Related Activities and Participation Affected:  1. General Tasks and Demands  2. Mobility  3. Self Care  4.  Interpersonal Interactions and Relationships    Number of elements (examined above) that affect the Plan of Care: 1-2: LOW COMPLEXITY    CLINICAL PRESENTATION:    Presentation:   Stable and uncomplicated: LOW COMPLEXITY    CLINICAL DECISION MAKING:    Use of outcome tool(s) and clinical judgement create a POC that gives a: Clear prediction of patient's progress: LOW COMPLEXITY

## 2021-12-03 ENCOUNTER — HOSPITAL ENCOUNTER (OUTPATIENT)
Dept: PHYSICAL THERAPY | Age: 56
Discharge: HOME | End: 2021-12-03
Payer: MEDICARE

## 2021-12-03 PROCEDURE — 97140 MANUAL THERAPY 1/> REGIONS: CPT

## 2021-12-03 PROCEDURE — 97110 THERAPEUTIC EXERCISES: CPT

## 2021-12-03 NOTE — PROGRESS NOTES
Jazz Oseguera  : 1965  Primary: Blease Chi [employer] Medicare Choice *  Secondary:  [facility]  [address], [address], [place], [address]  [phone]   [phone]      OUTPATIENT PHYSICAL THERAPY: Daily Treatment Note 12/3/2021    ICD-10: Treatment Diagnosis: Painful orthopaedic hardware [facility]) [S12.12DQ]               Treatment Diagnosis 2: Left ankle pain, unspecified chronicity [M25.572]               Treatment Diagnosis 3: Other abnormalities of gait and mobility [R26.89]   Precautions: FALL RISK  Allergies: Ambien [zolpidem], Chantix [varenicline], Other medication, Prolia [denosumab], Tegretol [carbamazepine], and Trazodone   TREATMENT PLAN:  Effective Dates: 2021 TO 2022 (60 days). Frequency/Duration: 1-2 times a week for 60 Day(s) MEDICAL/REFERRING DIAGNOSIS:  Painful orthopaedic hardware [facility]) Adria Strong  Left ankle pain, unspecified chronicity [M25.572]   DATE OF ONSET: 2021: left revision double level bunionectomy, left calcaneal autograft harvest.                              2021: 2nd/3rd IP joint fusion and bunionectomy  REFERRING PHYSICIAN: Candice Cruz, *  MD Orders: Evaluate and Treat  Return MD Appointment: 2021      Pre-treatment Symptoms/Complaints: Patient reported having good and bad days. Pt. Reported increased pain with weight bearing. Pain: Initial: Pain Intensity 1: 3  Pain Location 1: Foot, Toe (comment which one) (left great toe pain)  Pain Orientation 1: Left  Post Session:  6/10   Medications Last Reviewed:  12/3/2021  Updated Objective Findings:  Pt. was compliant with all exercises. TREATMENT:   THERAPEUTIC EXERCISE: (30 minutes ):  Exercises per grid below to improve mobility, strength, balance and coordination.   Required mod visual, verbal, manual and tactile cues to promote proper body alignment, promote proper body posture, promote proper body mechanics and promote proper body breathing techniques. Progressed resistance, range, repetitions and complexity of movement as indicated. Date:  11/23/2021 Date:  12/3/21 Date:     Activity/Exercise Parameters Parameters Parameters   Ankle Circles NEXT SESSION X 30 reps [address] in supine with BLE's on incline     Ankle Dorsiflexion/Plantarflexion NEXT SESSION Supine with BLE's on incline x 30 reps     Ankle Inversion/Eversion NEXT SESSION X 30 reps each BLE's on incline     Seated Calf Stretch  NEXT SESSION  Supine with strap 4x30 sec hold    Nu step   Level 4 x 6 mins                  Time spent with patient reviewing proper muscle recruitment and technique with exercises. MANUAL THERAPY: (25 minutes): Soft tissue mobilization was utilized and necessary because of the patient's restricted motion of soft tissue   Soft tissue mobilization to gastrocnemius, soleus and peroneals on the L to decrease pain and tightness.  Dorsal and plantar surfaces addressed with soft tissue mobs as well to decrease pain and tightness. MODALITIES: (0 minutes):      None today     HEP: As above; handouts given to patient for all exercises. Treatment/Session Summary:    · Response to Treatment:  Pt. reported increased soreness after exercises today. · Baseline vitals (11/23/2021): BP: 100/59mmHg, HR: 67, SpO: 98  · Communication/Consultation:  Education on exam findings, plan of care, rationale for treatment. · Equipment provided today:  None today  · Recommendations/Intent for next treatment session: Next visit will focus on manual therapy, exercises as tolerated.      Total Treatment Billable Duration:  55 mins   PT Patient Time In/Time Out  Time In: 1000  Time Out: [address], [place]

## 2021-12-07 ENCOUNTER — HOSPITAL ENCOUNTER (OUTPATIENT)
Dept: PHYSICAL THERAPY | Age: 56
Discharge: HOME | End: 2021-12-07
Payer: MEDICARE

## 2021-12-07 PROCEDURE — 97140 MANUAL THERAPY 1/> REGIONS: CPT

## 2021-12-07 PROCEDURE — 97110 THERAPEUTIC EXERCISES: CPT

## 2021-12-07 NOTE — PROGRESS NOTES
Junior Lagunas  : 1965  Primary: Maurice Livingston Medicare Choice *  Secondary:  [address] at [facility]  [address] [place], [address] [place], [address]  [phone]   [phone]      OUTPATIENT PHYSICAL THERAPY: Daily Treatment Note 2021    ICD-10: Treatment Diagnosis: Painful orthopaedic hardware [facility]) [G21.52RI]               Treatment Diagnosis 2: Left ankle pain, unspecified chronicity [M25.572]               Treatment Diagnosis 3: Other abnormalities of gait and mobility [R26.89]   Precautions: FALL RISK  Allergies: Ambien [zolpidem], Chantix [varenicline], Other medication, Prolia [denosumab], Tegretol [carbamazepine], and Trazodone   TREATMENT PLAN:  Effective Dates: 2021 TO 2022 (60 days). Frequency/Duration: 1-2 times a week for 60 Day(s) MEDICAL/REFERRING DIAGNOSIS:  Painful orthopaedic hardware [facility]) Fatou Melvin  Left ankle pain, unspecified chronicity [M25.572]   DATE OF ONSET: 2021: left revision double level bunionectomy, left calcaneal autograft harvest.                              2021: 2nd/3rd IP joint fusion and bunionectomy  REFERRING PHYSICIAN: Mylene Flanagan, *  MD Orders: Evaluate and Treat  Return MD Appointment: 2021      Pre-treatment Symptoms/Complaints: Patient reported mainly right ankle pain and bilateral plantar surfaces burning. Pain: Initial: Pain Intensity 1: 4  Pain Location 1: Ankle, Foot  Pain Orientation 1: Right  Post Session:  10 sore after exercises. Medications Last Reviewed:  2021  Updated Objective Findings:  Pt. was compliant with all exercises. TREATMENT:   THERAPEUTIC EXERCISE: (30 minutes ):  Exercises per grid below to improve mobility, strength, balance and coordination.   Required mod visual, verbal, manual and tactile cues to promote proper body alignment, promote proper body posture, promote proper body mechanics and promote proper body breathing techniques. Progressed resistance, range, repetitions and complexity of movement as indicated. Date:  11/23/2021 Date:  12/3/21 Date:  12/7/21   Activity/Exercise Parameters Parameters Parameters   Ankle Circles NEXT SESSION X 30 reps CW & CCW in supine with BLE's on incline  X 30 reps CW & CCW in supine with BLE's on incline   Ankle Dorsiflexion/Plantarflexion NEXT SESSION Supine with BLE's on incline x 30 reps  Supine with BLE's on incline x 30 reps    Ankle Inversion/Eversion NEXT SESSION X 30 reps each BLE's on incline  X 30 reps BLE's on incline    Seated Calf Stretch  NEXT SESSION  Supine with strap 4x30 sec hold Supine with strap 4x30 sec hold    Nu step   Level 4 x 6 mins Level 4 x 10 mins    Straight leg raises    X 10 reps each LE    Short arc quad   X 10 reps each LE     Time spent with patient reviewing proper muscle recruitment and technique with exercises. MANUAL THERAPY: (25 minutes): Soft tissue mobilization was utilized and necessary because of the patient's restricted motion of soft tissue   Soft tissue mobilization to gastrocnemius, soleus and peroneals on the L to decrease pain and tightness.  Dorsal and plantar surfaces addressed with soft tissue mobs as well to decrease pain and tightness. MODALITIES: (0 minutes):      None today     HEP: As above; handouts given to patient for all exercises. Treatment/Session Summary:    · Response to Treatment:  Pt. reported increased soreness after exercises today. · Baseline vitals (11/23/2021): BP: 100/59mmHg, HR: 67, SpO: 98  · Communication/Consultation:  Education on exam findings, plan of care, rationale for treatment. · Equipment provided today:  None today  · Recommendations/Intent for next treatment session: Next visit will focus on manual therapy, exercises as tolerated.      Total Treatment Billable Duration:  55 mins   PT Patient Time In/Time Out  Time In: 3380  Time Out: [address]

## 2021-12-09 ENCOUNTER — APPOINTMENT (OUTPATIENT)
Dept: PHYSICAL THERAPY | Age: 56
End: 2021-12-09
Payer: MEDICARE

## 2021-12-15 ENCOUNTER — HOSPITAL ENCOUNTER (OUTPATIENT)
Dept: PHYSICAL THERAPY | Age: 56
Discharge: HOME | End: 2021-12-15
Payer: MEDICARE

## 2021-12-15 PROCEDURE — 97110 THERAPEUTIC EXERCISES: CPT

## 2021-12-15 PROCEDURE — 97140 MANUAL THERAPY 1/> REGIONS: CPT

## 2021-12-15 NOTE — PROGRESS NOTES
James Lee  : 1965  Primary: Caden Livingston Medicare Choice *  Secondary:  [address] at [facility]  [address] [place], [address] [place] [address], [address] [place] [address] Street  [phone]   [phone]      OUTPATIENT PHYSICAL THERAPY: Daily Treatment Note 12/15/2021    ICD-10: Treatment Diagnosis: Painful orthopaedic hardware [facility]) [U03.06IU]               Treatment Diagnosis 2: Left ankle pain, unspecified chronicity [M25.572]               Treatment Diagnosis 3: Other abnormalities of gait and mobility [R26.89]   Precautions: FALL RISK  Allergies: Ambien [zolpidem], Chantix [varenicline], Other medication, Prolia [denosumab], Tegretol [carbamazepine], and Trazodone   TREATMENT PLAN:  Effective Dates: 2021 TO 2022 (60 days). Frequency/Duration: 1-2 times a week for 60 Day(s) MEDICAL/REFERRING DIAGNOSIS:  Painful orthopaedic hardware [facility]) Valeta Nap  Left ankle pain, unspecified chronicity [M25.572]   DATE OF ONSET: 2021: left revision double level bunionectomy, left calcaneal autograft harvest.                              2021: 2nd/3rd IP joint fusion and bunionectomy  REFERRING PHYSICIAN: Roberto Ortega, *  MD Orders: Evaluate and Treat  Return MD Appointment: 2021      Pre-treatment Symptoms/Complaints: Patient reports mixing up her medicines and that she was falling asleep while standing up and fell several times over the past couple days. Reports no injury. Pain: Initial: Pain Intensity 1: 5  Pain Location 1: Ankle,Foot  Pain Orientation 1: Right  Post Session:  5/10    Medications Last Reviewed:  12/15/2021  Updated Objective Findings:  Patient demonstrating improved AROM today. TREATMENT:   THERAPEUTIC EXERCISE: (40 minutes ):  Exercises per grid below to improve mobility, strength, balance and coordination.   Required mod visual, verbal, manual and tactile cues to promote proper body alignment, promote proper body posture, promote proper body mechanics and promote proper body breathing techniques. Progressed resistance, range, repetitions and complexity of movement as indicated. Date:  12/15/2021 Date:  12/3/21 Date:  12/7/21   Activity/Exercise Parameters Parameters Parameters   Ankle Circles --- X 30 reps CW & CCW in supine with BLE's on incline  X 30 reps CW & CCW in supine with BLE's on incline   Ankle Dorsiflexion/Plantarflexion 3 x 10 green  Supine with BLE's on incline x 30 reps  Supine with BLE's on incline x 30 reps    Ankle Inversion/Eversion 3 x 10 red X 30 reps each BLE's on incline  X 30 reps BLE's on incline    Calf Stretch      3 x 30 sec incline   Supine with strap 4x30 sec hold Supine with strap 4x30 sec hold    Nu step  Level 4 x 12 minutes  Level 4 x 6 mins Level 4 x 10 mins    Straight leg raises  ---  X 10 reps each LE    Short arc quad ---  X 10 reps each LE   Rocker Board  X 4 minutes      BAPS Board  X 4 minutes                          Time spent with patient reviewing proper muscle recruitment and technique with exercises. MANUAL THERAPY: (20 minutes): Soft tissue mobilization was utilized and necessary because of the patient's restricted motion of soft tissue   Soft tissue mobilization to gastrocnemius, soleus and peroneals on the L to decrease pain and tightness.  Dorsal and plantar surfaces addressed with soft tissue mobs as well to decrease pain and tightness. MODALITIES: (0 minutes):      None today     HEP: As above; handouts given to patient for all exercises. Treatment/Session Summary:    · Response to Treatment:  Patient reported \"good hurt\" during manual therapy  · Baseline vitals (11/23/2021): BP: 100/59mmHg, HR: 67, SpO: 98  · Communication/Consultation:  Education on exam findings, plan of care, rationale for treatment.   · Equipment provided today:  Green and red theraband   · Recommendations/Intent for next treatment session: Next visit will focus on manual therapy, exercises as tolerated.      Total Treatment Billable Duration:  60 mins   PT Patient Time In/Time Out  Time In: 1540  Time Out: 1635  David Do PT

## 2021-12-21 ENCOUNTER — HOSPITAL ENCOUNTER (OUTPATIENT)
Dept: PHYSICAL THERAPY | Age: 56
Discharge: HOME | End: 2021-12-21
Payer: MEDICARE

## 2021-12-21 PROCEDURE — 97110 THERAPEUTIC EXERCISES: CPT

## 2021-12-21 PROCEDURE — 97140 MANUAL THERAPY 1/> REGIONS: CPT

## 2021-12-21 NOTE — PROGRESS NOTES
Jasmin James  : 1965  Primary: Bob Livingston Medicare Choice *  Secondary:  [address] at [facility]  [address] [place], [address] [place] [address], [place], [address] [place] [address]  [phone]   [phone]      OUTPATIENT PHYSICAL THERAPY: Daily Treatment Note 2021    ICD-10: Treatment Diagnosis: Painful orthopaedic hardware [facility]) [I25.01KI]               Treatment Diagnosis 2: Left ankle pain, unspecified chronicity [M25.572]               Treatment Diagnosis 3: Other abnormalities of gait and mobility [R26.89]   Precautions: FALL RISK  Allergies: Ambien [zolpidem], Chantix [varenicline], Other medication, Prolia [denosumab], Tegretol [carbamazepine], and Trazodone   TREATMENT PLAN:  Effective Dates: 2021 TO 2022 (60 days). Frequency/Duration: 1-2 times a week for 60 Day(s) MEDICAL/REFERRING DIAGNOSIS:  Painful orthopaedic hardware [facility]) [T84.84XA]  Left ankle pain, unspecified chronicity [M25.572]   DATE OF ONSET: 2021: left revision double level bunionectomy, left calcaneal autograft harvest.                              2021: 2nd/3rd IP joint fusion and bunionectomy  REFERRING PHYSICIAN: Claribel Alfaro, *  MD Orders: Evaluate and Treat  Return MD Appointment: 2021      Pre-treatment Symptoms/Complaints: Patient reports that she thinks she is doing better and isn't really having pain today she is just off balance. Patient 20 minutes late to session. Pain: Initial: Pain Intensity 1: 0  Pain Location 1: Ankle,Foot  Pain Orientation 1: Right  Post Session:  5/10 fatigued    Medications Last Reviewed:  2021  Updated Objective Findings:  Patient demonstrating improved exercise technique today. TREATMENT:   THERAPEUTIC EXERCISE: (25 minutes ):  Exercises per grid below to improve mobility, strength, balance and coordination.   Required mod visual, verbal, manual and tactile cues to promote proper body alignment, promote proper body posture, promote proper body mechanics and promote proper body breathing techniques. Progressed resistance, range, repetitions and complexity of movement as indicated. Date:  12/15/2021 Date:  12/21/21 Date:  12/7/21   Activity/Exercise Parameters Parameters Parameters   Ankle Circles --- --- X 30 reps CW & CCW in supine with BLE's on incline   Ankle Dorsiflexion/Plantarflexion 3 x 10 green  3 x 10 black  Supine with BLE's on incline x 30 reps    Ankle Inversion/Eversion 3 x 10 red 3 x 10 L X 30 reps BLE's on incline    Calf Stretch      3 x 30 sec incline   Seated with strap knee flexed 3 x 30 sec hold Supine with strap 4x30 sec hold    Nu step  Level 4 x 12 minutes  Level 4 x 10 mins Level 4 x 10 mins    Straight leg raises  --- --- X 10 reps each LE    Short arc quad --- --- X 10 reps each LE   Rocker Board  X 4 minutes  X 4 minutes     BAPS Board  X 4 minutes  X 4 minutes                         Time spent with patient reviewing proper muscle recruitment and technique with exercises. MANUAL THERAPY: (15 minutes): Soft tissue mobilization was utilized and necessary because of the patient's restricted motion of soft tissue   Soft tissue mobilization to gastrocnemius, soleus and peroneals on the L to decrease pain and tightness.  Dorsal and plantar surfaces addressed with soft tissue mobs as well to decrease pain and tightness. MODALITIES: (0 minutes):      None today     HEP: As above; handouts given to patient for all exercises. Treatment/Session Summary:    · Response to Treatment:  Patient reported feeling good after session. · Baseline vitals (11/23/2021): BP: 100/59mmHg, HR: 67, SpO: 98  · Communication/Consultation:  Education on exam findings, plan of care, rationale for treatment. · Equipment provided today:  None today  · Recommendations/Intent for next treatment session: Next visit will focus on manual therapy, exercises as tolerated.      Total Treatment Billable Duration:  40 mins PT Patient Time In/Time Out  Time In: 1350  Time Out: [address], PT

## 2021-12-28 ENCOUNTER — HOSPITAL ENCOUNTER (OUTPATIENT)
Dept: PHYSICAL THERAPY | Age: 56
Discharge: HOME | End: 2021-12-28
Payer: MEDICARE

## 2021-12-28 PROCEDURE — 97110 THERAPEUTIC EXERCISES: CPT

## 2021-12-28 PROCEDURE — 97140 MANUAL THERAPY 1/> REGIONS: CPT

## 2021-12-28 NOTE — PROGRESS NOTES
Finesse Smith  : 1965  Primary: Leoncio Dial [employer] Medicare Choice *  Secondary:  [address] at [facility]  [address] [place], [address] [place] [address], [facility], [address] [place] [address]  [phone]   [phone]      OUTPATIENT PHYSICAL THERAPY: Daily Treatment Note 2021    ICD-10: Treatment Diagnosis: Painful orthopaedic hardware [facility]) [A47.40DT]               Treatment Diagnosis 2: Left ankle pain, unspecified chronicity [M25.572]               Treatment Diagnosis 3: Other abnormalities of gait and mobility [R26.89]   Precautions: FALL RISK  Allergies: Ambien [zolpidem], Chantix [varenicline], Other medication, Prolia [denosumab], Tegretol [carbamazepine], and Trazodone   TREATMENT PLAN:  Effective Dates: 2021 TO 2022 (60 days). Frequency/Duration: 1-2 times a week for 60 Day(s) MEDICAL/REFERRING DIAGNOSIS:  Painful orthopaedic hardware ([facility] [address] Jessie Stewart  Left ankle pain, unspecified chronicity [M25.572]   DATE OF ONSET: 2021: left revision double level bunionectomy, left calcaneal autograft harvest.                              2021: 2nd/3rd IP joint fusion and bunionectomy  REFERRING PHYSICIAN: Cony Patel, *  MD Orders: Evaluate and Treat  Return MD Appointment: 2021      Pre-treatment Symptoms/Complaints: Patient reports overall pain levels are improving; pt does reports experienced fall while taking down [place] lights; pt reports no significant injuries from fall however does state \"it made my back sore\". Pain: Initial: Pain Intensity 1: 3  Pain Location 1: Ankle,Foot  Pain Orientation 1: Right  Post Session:  3/10    Medications Last Reviewed:  2021  Updated Objective Findings:  Gait: Slight decreased step length observed bilaterally, decreased heel to toe progression observed bilaterally. TREATMENT:   THERAPEUTIC EXERCISE: (30 minutes ):  Exercises per grid below to improve mobility, strength, balance and coordination. Required mod visual, verbal, manual and tactile cues to promote proper body alignment, promote proper body posture, promote proper body mechanics and promote proper body breathing techniques. Progressed resistance, range, repetitions and complexity of movement as indicated. Date:  12/15/2021 Date:  12/21/21 Date:  12/28/21   Activity/Exercise Parameters Parameters Parameters   Ankle Circles --- --- X 30 reps CW & CCW in supine with BLE's on incline   Ankle Dorsiflexion/Plantarflexion 3 x 10 green  3 x 10 black  3x10 Black   Ankle Inversion/Eversion 3 x 10 red 3 x 10 L 3x10 Green   Calf Stretch      3 x 30 sec incline   Seated with strap knee flexed 3 x 30 sec hold Towel stretch 3x30 sec   Nu step  Level 4 x 12 minutes  Level 4 x 10 mins Level 4 x 10 mins    Straight leg raises  --- --- --   Short arc quad --- --- --   Rocker Board  X 4 minutes  X 4 minutes  X30, seated    BAPS Board  X 4 minutes  X 4 minutes  --                       Time spent with patient reviewing proper muscle recruitment and technique with exercises. MANUAL THERAPY: (23 minutes): Soft tissue mobilization was utilized and necessary because of the patient's restricted motion of soft tissue   Soft tissue mobilization to gastrocnemius, soleus and peroneals on the L to decrease pain and tightness.  Dorsal and plantar surfaces addressed with soft tissue mobs as well to decrease pain and tightness. MODALITIES: (0 minutes):      None today     HEP: As above; handouts given to patient for all exercises. Treatment/Session Summary:    · Response to Treatment:  Good tolerance to manual interventions; verbal cues for posture to improve uprigth stance during ambulation. · Baseline vitals (11/23/2021): BP: 100/59mmHg, HR: 67, SpO: 98  · Communication/Consultation:  Education on exam findings, plan of care, rationale for treatment.   · Equipment provided today:  None today  · Recommendations/Intent for next treatment session: Next visit will focus on manual therapy, exercises as tolerated.      Total Treatment Billable Duration:  53 mins   PT Patient Time In/Time Out  Time In: 1235  Time Out: 1329  Arsh Valdes, PT

## 2022-01-03 ENCOUNTER — HOSPITAL ENCOUNTER (OUTPATIENT)
Dept: PHYSICAL THERAPY | Age: 57
Discharge: HOME | End: 2022-01-03
Payer: MEDICARE

## 2022-01-03 PROCEDURE — 97110 THERAPEUTIC EXERCISES: CPT

## 2022-01-03 NOTE — PROGRESS NOTES
Jennifer Simpson  : 1965  Primary: Marciano Goldberg [employer] Medicare Choice *  Secondary:  [facility]  [address] [place], [address], [place], [address]  [phone]   [phone]      OUTPATIENT PHYSICAL THERAPY: Daily Treatment Note 1/3/2022    ICD-10: Treatment Diagnosis: Painful orthopaedic hardware [facility]) [T01.46KJ]               Treatment Diagnosis 2: Left ankle pain, unspecified chronicity [M25.572]               Treatment Diagnosis 3: Other abnormalities of gait and mobility [R26.89]   Precautions: FALL RISK  Allergies: Ambien [zolpidem], Chantix [varenicline], Other medication, Prolia [denosumab], Tegretol [carbamazepine], and Trazodone   TREATMENT PLAN:  Effective Dates: 2021 TO 2022 (60 days). Frequency/Duration: 1-2 times a week for 60 Day(s) MEDICAL/REFERRING DIAGNOSIS:  Painful orthopaedic hardware [facility]) Osito Damon  Left ankle pain, unspecified chronicity [M25.572]   DATE OF ONSET: 2021: left revision double level bunionectomy, left calcaneal autograft harvest.                              2021: 2nd/3rd IP joint fusion and bunionectomy  REFERRING PHYSICIAN: Joleen Garduno, *  MD Orders: Evaluate and Treat  Return MD Appointment: 2021      Pre-treatment Symptoms/Complaints: Patient reported balance being an issue for her. Pain: Initial: Pain Intensity 1: 4  Pain Location 1: Ankle,Foot  Pain Orientation 1: Left,Right  Post Session:  3/10    Medications Last Reviewed:  1/3/2022  Updated Objective Findings:  Gait: Slight decreased step length observed bilaterally, decreased heel to toe progression observed bilaterally. TREATMENT:   THERAPEUTIC EXERCISE: (55 minutes ):  Exercises per grid below to improve mobility, strength, balance and coordination.   Required mod visual, verbal, manual and tactile cues to promote proper body alignment, promote proper body posture, promote proper body mechanics and promote proper body breathing techniques. Progressed resistance, range, repetitions and complexity of movement as indicated. Date:  1/3/22 Date:  12/21/21 Date:  12/28/21   Activity/Exercise Parameters Parameters Parameters   Ankle Circles X 30 reps CW & CCW in supine with BLE's on incline  --- X 30 reps CW & CCW in supine with BLE's on incline   Ankle Dorsiflexion/Plantarflexion 3 x 10 black 3 x 10 black  3x10 Black   Ankle Inversion/Eversion 3 x 10 Black 3 x 10 L 3x10 Green   Calf Stretch      4 x 30 sec incline   Seated with strap knee flexed 3 x 30 sec hold Towel stretch 3x30 sec   Nu step  Level 4 x 12 minutes  Level 4 x 10 mins Level 4 x 10 mins    Straight leg raises  --- --- --   Short arc quad --- --- --   Rocker Board  X 4 minutes seated anterior & posterior h X 4 minutes  X30, seated    BAPS Board  X 4 minutes  X 4 minutes  --                       Time spent with patient reviewing proper muscle recruitment and technique with exercises. MANUAL THERAPY: (0 minutes): Soft tissue mobilization was utilized and necessary because of the patient's restricted motion of soft tissue (NONE TODAY)   Soft tissue mobilization to gastrocnemius, soleus and peroneals on the L to decrease pain and tightness.  Dorsal and plantar surfaces addressed with soft tissue mobs as well to decrease pain and tightness. MODALITIES: (0 minutes):      None today     HEP: As above; handouts given to patient for all exercises. Treatment/Session Summary:    · Response to Treatment:  Pt. was compliant with all exercises with verbal and tactile cuing. · Baseline vitals (11/23/2021): BP: 100/59mmHg, HR: 67, SpO: 98  · Communication/Consultation:  Education on exam findings, plan of care, rationale for treatment. · Equipment provided today:  None today  · Recommendations/Intent for next treatment session: Next visit will focus on manual therapy, exercises as tolerated.      Total Treatment Billable Duration:  55 mins   PT Patient Time In/Time Out  Time In: 1535  Time Out: Shay Castillo [address], [place]

## 2022-01-11 ENCOUNTER — HOSPITAL ENCOUNTER (OUTPATIENT)
Dept: PHYSICAL THERAPY | Age: 57
Discharge: HOME | End: 2022-01-11
Payer: MEDICARE

## 2022-01-11 PROCEDURE — 97140 MANUAL THERAPY 1/> REGIONS: CPT

## 2022-01-11 PROCEDURE — 97110 THERAPEUTIC EXERCISES: CPT

## 2022-01-11 NOTE — PROGRESS NOTES
Myriam Clayton  : 1965  Primary: Dashawn Livingston Medicare Choice *  Secondary:  [address] at [facility]  [address] [place], [address] [place], [place], [address]  [phone]   [phone]      OUTPATIENT PHYSICAL THERAPY: Daily Treatment Note 2022    ICD-10: Treatment Diagnosis: Painful orthopaedic hardware [facility]) [V30.32TH]               Treatment Diagnosis 2: Left ankle pain, unspecified chronicity [M25.572]               Treatment Diagnosis 3: Other abnormalities of gait and mobility [R26.89]   Precautions: FALL RISK  Allergies: Ambien [zolpidem], Chantix [varenicline], Other medication, Prolia [denosumab], Tegretol [carbamazepine], and Trazodone   TREATMENT PLAN:  Effective Dates: 2021 TO 2022 (60 days). Frequency/Duration: 1-2 times a week for 60 Day(s) MEDICAL/REFERRING DIAGNOSIS:  Painful orthopaedic hardware [facility]) Lilli Davidson  Left ankle pain, unspecified chronicity [M25.572]   DATE OF ONSET: 2021: left revision double level bunionectomy, left calcaneal autograft harvest.                              2021: 2nd/3rd IP joint fusion and bunionectomy  REFERRING PHYSICIAN: Luann Pearl, *  MD Orders: Evaluate and Treat  Return MD Appointment: 2021      Pre-treatment Symptoms/Complaints: Patient reports that she is still having trouble with her balance. Patient also reports having a lot of pain in left foot 4 little toes and right foot middle 2-3 toes. Pain: Initial: Pain Intensity 1: 4  Pain Location 1: Ankle,Foot  Pain Orientation 1: Left,Right  Post Session:  3/10    Medications Last Reviewed:  2022  Updated Objective Findings:  Palpation: Left and Right Anterior Tibialis, Posterior Tibialis, Gastrocnemius, Soleus, Plantar Fascia Tightness, Tenderness, Trigger Points. TREATMENT:   THERAPEUTIC EXERCISE: (23 minutes ):  Exercises per grid below to improve mobility, strength, balance and coordination.   Required mod visual, verbal, manual and tactile cues to promote proper body alignment, promote proper body posture, promote proper body mechanics and promote proper body breathing techniques. Progressed resistance, range, repetitions and complexity of movement as indicated. Date:  1/3/22 Date:  1/11/2022 Date:  12/28/21   Activity/Exercise Parameters Parameters Parameters   Ankle Circles X 30 reps CW & CCW in supine with BLE's on incline   X 30 reps CW & CCW in supine with BLE's on incline   Ankle Dorsiflexion/Plantarflexion 3 x 10 black  3x10 Black   Ankle Inversion/Eversion 3 x 10 Black  3x10 Green   Calf Stretch      4 x 30 sec incline   6 x 30 sec Slantboard Towel stretch 3x30 sec   Nu step  Level 4 x 12 minutes   Level 4 x 10 mins    Straight leg raises  ---  --   Short arc quad ---  --   Rocker Board  X 4 minutes seated anterior & posterior h  X30, seated    BAPS Board  X 4 minutes   --   Sit to Stand  3 x 10 Chair with Airex    Wobble Board  3 x 20 Standing Bilateral UE Support    Balance  Standing on Airex Eyes Closed No UE Support 3 x 10 seconds      Time spent with patient reviewing proper muscle recruitment and technique with exercises. MANUAL THERAPY: (30 minutes): Soft tissue mobilization was utilized and necessary because of the patient's restricted motion of soft tissue    Soft Tissue Mobilization Bilateral Anterior Tibialis, Posterior Tibialis, Gastrocnemius, Soleus, Plantar Fascia. MODALITIES: (0 minutes):      None today     HEP: As above; handouts given to patient for all exercises. Treatment/Session Summary:    · Response to Treatment:  Patient demonstrated limited toe mobility and flexibility, limited dorsiflexion. Patient demonstrated generalized gait unsteadiness and deficits in proprioception. Patient required max verbal cues and intermittent tactile cues for posture, form, mechanics.  Patient would benefit from progression of soft tissue mobility, ROM, muscle activation, strength, balance to improve function and safety both statically and dynamically. · Baseline vitals (11/23/2021): BP: 100/59mmHg, HR: 67, SpO: 98  · Communication/Consultation:  Education on exam findings, plan of care, rationale for treatment. · Equipment provided today:  None today  · Recommendations/Intent for next treatment session: Next visit will focus on soft tissue mobility, muscle activation, ROM, strength, balance, proprioception both statically and dynamically.      Total Treatment Billable Duration:  53 mins   PT Patient Time In/Time Out  Time In: 1340  Time Out: [address], PT

## 2022-01-18 NOTE — PROGRESS NOTES
Physical Therapy  [address] [place] [address] at [facility] 1/18/2022     Patient stating she is unable to drive today; requesting to cancel today's appointment.      Elvira Sherwood PT, DPT

## 2022-01-19 ENCOUNTER — HOSPITAL ENCOUNTER (OUTPATIENT)
Dept: PHYSICAL THERAPY | Age: 57
Discharge: HOME | End: 2022-01-19
Payer: MEDICARE

## 2022-01-19 PROCEDURE — 97110 THERAPEUTIC EXERCISES: CPT

## 2022-01-19 NOTE — PROGRESS NOTES
Krissy Jolly  : 1965  Primary: [facility] Medicare Choice *  Secondary:  [address] at [facility]  [address], [address], [place], [address]  [phone]   [phone]      OUTPATIENT PHYSICAL THERAPY: Daily Treatment Note 2022    ICD-10: Treatment Diagnosis: Painful orthopaedic hardware [facility]) [D99.90GG]               Treatment Diagnosis 2: Left ankle pain, unspecified chronicity [M25.572]               Treatment Diagnosis 3: Other abnormalities of gait and mobility [R26.89]   Precautions: FALL RISK  Allergies: Ambien [zolpidem], Chantix [varenicline], Other medication, Prolia [denosumab], Tegretol [carbamazepine], and Trazodone   TREATMENT PLAN:  Effective Dates: 2021 TO 2022 (60 days). Frequency/Duration: 1-2 times a week for 60 Day(s) MEDICAL/REFERRING DIAGNOSIS:  Painful orthopaedic hardware [facility]) Sherell Schwab  Left ankle pain, unspecified chronicity [M25.572]   DATE OF ONSET: 2021: left revision double level bunionectomy, left calcaneal autograft harvest.                              2021: 2nd/3rd IP joint fusion and bunionectomy  REFERRING PHYSICIAN: Sánchez Franz, *  MD Orders: Evaluate and Treat  Return MD Appointment: 2021      Pre-treatment Symptoms/Complaints: Patient reported bilateral plantar surfaces of her feet ache and low back. Pt. Stated she had been on her feet more with cleaning out her basement. Pain: Initial: Pain Intensity 1: 4  Pain Location 1: Ankle,Back,Foot  Pain Orientation 1: Left,Right  Post Session:  3/10    Medications Last Reviewed:  2022  Updated Objective Findings:  Pt. had a slow antalgic gait from her feet and back discomfortl. TREATMENT:   THERAPEUTIC EXERCISE: (55 minutes ):  Exercises per grid below to improve mobility, strength, balance and coordination.   Required mod visual, verbal, manual and tactile cues to promote proper body alignment, promote proper body posture, promote proper body mechanics and promote proper body breathing techniques. Progressed resistance, range, repetitions and complexity of movement as indicated. Date:  1/3/22 Date:  1/11/2022 Date:  1/19/22   Activity/Exercise Parameters Parameters Parameters   Ankle Circles X 30 reps CW & CCW in supine with BLE's on incline   X 30 reps CW & CCW in supine with BLE's on incline   Ankle Dorsiflexion/Plantarflexion 3 x 10 black  3x10 Black   Ankle Inversion/Eversion 3 x 10 Black  3x10 Green   Calf Stretch      4 x 30 sec incline   6 x 30 sec Slantboard 4 x30 sec hold on incline   Nu step  Level 4 x 12 minutes   Level 4 x 10 mins    Straight leg raises  ---  --   Short arc quad ---  --   Rocker Board  X 4 minutes seated anterior & posterior h  X30 standing at bar anterior posterior   BAPS Board  X 4 minutes   X 4 mins    Sit to Stand  3 x 10 Chair with Airex 3x10 chair with airex   Wobble Board  3 x 20 Standing Bilateral UE Support 3x20 sec hold standing in parallel bars with BUE support   Balance  Standing on Airex Eyes Closed No UE Support 3 x 10 seconds Standing on Airex eyes closed      Time spent with patient reviewing proper muscle recruitment and technique with exercises. MANUAL THERAPY: (0 minutes): Soft tissue mobilization was utilized and necessary because of the patient's restricted motion of soft tissue     None today    MODALITIES: (0 minutes):      None today     HEP: As above; handouts given to patient for all exercises. Treatment/Session Summary:    · Response to Treatment:  Patient was compliant with all exercises with verbal and tactile cuing. Pt stated her toes are curling up more and needs her doctor to be aware. Pt. Was instructed to call her doctor about her toes. · Baseline vitals (11/23/2021): BP: 100/59mmHg, HR: 67, SpO: 98  · Communication/Consultation:  Education on exam findings, plan of care, rationale for treatment.   · Equipment provided today:  None today  · Recommendations/Intent for next treatment session: Next visit will focus on soft tissue mobility, muscle activation, ROM, strength, balance, proprioception both statically and dynamically.      Total Treatment Billable Duration:  55 mins   PT Patient Time In/Time Out  Time In: 7981  Time Out: [address] [place]

## 2022-04-14 PROBLEM — S32.040A COMPRESSION FRACTURE OF FOURTH LUMBAR VERTEBRA (HCC): Status: ACTIVE | Noted: 2022-04-14

## 2022-04-17 ENCOUNTER — ANESTHESIA EVENT (OUTPATIENT)
Dept: SURGERY | Age: 57
End: 2022-04-17
Payer: MEDICARE

## 2022-04-18 ENCOUNTER — APPOINTMENT (OUTPATIENT)
Dept: GENERAL RADIOLOGY | Age: 57
End: 2022-04-18
Attending: NEUROLOGICAL SURGERY
Payer: MEDICARE

## 2022-04-18 ENCOUNTER — ANESTHESIA (OUTPATIENT)
Dept: SURGERY | Age: 57
End: 2022-04-18
Payer: MEDICARE

## 2022-04-18 ENCOUNTER — HOSPITAL ENCOUNTER (OUTPATIENT)
Age: 57
Setting detail: OUTPATIENT SURGERY
Discharge: HOME | End: 2022-04-18
Attending: NEUROLOGICAL SURGERY | Admitting: NEUROLOGICAL SURGERY
Payer: MEDICARE

## 2022-04-18 VITALS
HEART RATE: 83 BPM | SYSTOLIC BLOOD PRESSURE: 103 MMHG | OXYGEN SATURATION: 94 % | DIASTOLIC BLOOD PRESSURE: 59 MMHG | HEIGHT: 64 IN | RESPIRATION RATE: 14 BRPM | BODY MASS INDEX: 28.38 KG/M2 | TEMPERATURE: 97.1 F | WEIGHT: 166.2 LBS

## 2022-04-18 DIAGNOSIS — S32.040A COMPRESSION FRACTURE OF L4 VERTEBRA, INITIAL ENCOUNTER (HCC): Primary | ICD-10-CM

## 2022-04-18 LAB — POTASSIUM BLD-SCNC: 4.7 MMOL/L (ref 3.5–5.1)

## 2022-04-18 PROCEDURE — 74011250636 HC RX REV CODE- 250/636: Performed by: ANESTHESIOLOGY

## 2022-04-18 PROCEDURE — 77030011218 HC DEV BIOP BN KYPH -C: Performed by: NEUROLOGICAL SURGERY

## 2022-04-18 PROCEDURE — 76060000033 HC ANESTHESIA 1 TO 1.5 HR: Performed by: NEUROLOGICAL SURGERY

## 2022-04-18 PROCEDURE — C1713 ANCHOR/SCREW BN/BN,TIS/BN: HCPCS | Performed by: NEUROLOGICAL SURGERY

## 2022-04-18 PROCEDURE — 77030037088 HC TUBE ENDOTRACH ORAL NSL COVD-A: Performed by: ANESTHESIOLOGY

## 2022-04-18 PROCEDURE — 74011000250 HC RX REV CODE- 250: Performed by: NURSE ANESTHETIST, CERTIFIED REGISTERED

## 2022-04-18 PROCEDURE — 76210000021 HC REC RM PH II 0.5 TO 1 HR: Performed by: NEUROLOGICAL SURGERY

## 2022-04-18 PROCEDURE — 84132 ASSAY OF SERUM POTASSIUM: CPT

## 2022-04-18 PROCEDURE — 76010000149 HC OR TIME 1 TO 1.5 HR: Performed by: NEUROLOGICAL SURGERY

## 2022-04-18 PROCEDURE — 74011250637 HC RX REV CODE- 250/637: Performed by: ANESTHESIOLOGY

## 2022-04-18 PROCEDURE — 88307 TISSUE EXAM BY PATHOLOGIST: CPT

## 2022-04-18 PROCEDURE — 74011250636 HC RX REV CODE- 250/636: Performed by: NURSE ANESTHETIST, CERTIFIED REGISTERED

## 2022-04-18 PROCEDURE — 77030040922 HC BLNKT HYPOTHRM STRY -A: Performed by: ANESTHESIOLOGY

## 2022-04-18 PROCEDURE — 74011250637 HC RX REV CODE- 250/637: Performed by: NEUROLOGICAL SURGERY

## 2022-04-18 PROCEDURE — 22514 PERQ VERTEBRAL AUGMENTATION: CPT | Performed by: NEUROLOGICAL SURGERY

## 2022-04-18 PROCEDURE — 74011250636 HC RX REV CODE- 250/636: Performed by: NEUROLOGICAL SURGERY

## 2022-04-18 PROCEDURE — 77030008468 HC STPLR SKN VISIS TELE -A: Performed by: NEUROLOGICAL SURGERY

## 2022-04-18 PROCEDURE — 74011000636 HC RX REV CODE- 636: Performed by: NEUROLOGICAL SURGERY

## 2022-04-18 PROCEDURE — 76210000006 HC OR PH I REC 0.5 TO 1 HR: Performed by: NEUROLOGICAL SURGERY

## 2022-04-18 PROCEDURE — 77030028759 HC KT SPN BN TAMP FF KYPH -I2: Performed by: NEUROLOGICAL SURGERY

## 2022-04-18 PROCEDURE — 77030018673: Performed by: NEUROLOGICAL SURGERY

## 2022-04-18 PROCEDURE — 77030039425 HC BLD LARYNG TRULITE DISP TELE -A: Performed by: ANESTHESIOLOGY

## 2022-04-18 PROCEDURE — 74011000250 HC RX REV CODE- 250: Performed by: ANESTHESIOLOGY

## 2022-04-18 PROCEDURE — 74011000250 HC RX REV CODE- 250: Performed by: NEUROLOGICAL SURGERY

## 2022-04-18 PROCEDURE — 77030019908 HC STETH ESOPH SIMS -A: Performed by: ANESTHESIOLOGY

## 2022-04-18 PROCEDURE — 77030012894: Performed by: NEUROLOGICAL SURGERY

## 2022-04-18 PROCEDURE — 72100 X-RAY EXAM L-S SPINE 2/3 VWS: CPT

## 2022-04-18 PROCEDURE — 2709999900 HC NON-CHARGEABLE SUPPLY: Performed by: NEUROLOGICAL SURGERY

## 2022-04-18 PROCEDURE — 88311 DECALCIFY TISSUE: CPT

## 2022-04-18 DEVICE — BONE CEMENT CX01A XPEDE US
Type: IMPLANTABLE DEVICE | Site: SPINE LUMBAR | Status: FUNCTIONAL
Brand: KYPHON® XPEDE™ BONE CEMENT

## 2022-04-18 RX ORDER — SODIUM CHLORIDE, SODIUM LACTATE, POTASSIUM CHLORIDE, CALCIUM CHLORIDE 600; 310; 30; 20 MG/100ML; MG/100ML; MG/100ML; MG/100ML
75 INJECTION, SOLUTION INTRAVENOUS CONTINUOUS
Status: DISCONTINUED | OUTPATIENT
Start: 2022-04-18 | End: 2022-04-18 | Stop reason: HOSPADM

## 2022-04-18 RX ORDER — PROCHLORPERAZINE EDISYLATE 5 MG/ML
5 INJECTION INTRAMUSCULAR; INTRAVENOUS
Status: DISCONTINUED | OUTPATIENT
Start: 2022-04-18 | End: 2022-04-18 | Stop reason: HOSPADM

## 2022-04-18 RX ORDER — FENTANYL CITRATE 50 UG/ML
100 INJECTION, SOLUTION INTRAMUSCULAR; INTRAVENOUS AS NEEDED
Status: DISCONTINUED | OUTPATIENT
Start: 2022-04-18 | End: 2022-04-18 | Stop reason: HOSPADM

## 2022-04-18 RX ORDER — GLYCOPYRROLATE 0.2 MG/ML
INJECTION INTRAMUSCULAR; INTRAVENOUS AS NEEDED
Status: DISCONTINUED | OUTPATIENT
Start: 2022-04-18 | End: 2022-04-18 | Stop reason: HOSPADM

## 2022-04-18 RX ORDER — LIDOCAINE HYDROCHLORIDE 10 MG/ML
0.1 INJECTION INFILTRATION; PERINEURAL AS NEEDED
Status: DISCONTINUED | OUTPATIENT
Start: 2022-04-18 | End: 2022-04-18 | Stop reason: HOSPADM

## 2022-04-18 RX ORDER — LIDOCAINE HYDROCHLORIDE AND EPINEPHRINE 10; 10 MG/ML; UG/ML
INJECTION, SOLUTION INFILTRATION; PERINEURAL AS NEEDED
Status: DISCONTINUED | OUTPATIENT
Start: 2022-04-18 | End: 2022-04-18 | Stop reason: HOSPADM

## 2022-04-18 RX ORDER — CEFAZOLIN SODIUM/WATER 2 G/20 ML
2 SYRINGE (ML) INTRAVENOUS ONCE
Status: COMPLETED | OUTPATIENT
Start: 2022-04-18 | End: 2022-04-18

## 2022-04-18 RX ORDER — PROPOFOL 10 MG/ML
INJECTION, EMULSION INTRAVENOUS AS NEEDED
Status: DISCONTINUED | OUTPATIENT
Start: 2022-04-18 | End: 2022-04-18 | Stop reason: HOSPADM

## 2022-04-18 RX ORDER — SODIUM CHLORIDE 0.9 % (FLUSH) 0.9 %
5-40 SYRINGE (ML) INJECTION AS NEEDED
Status: DISCONTINUED | OUTPATIENT
Start: 2022-04-18 | End: 2022-04-18 | Stop reason: HOSPADM

## 2022-04-18 RX ORDER — NALOXONE HYDROCHLORIDE 0.4 MG/ML
0.1 INJECTION, SOLUTION INTRAMUSCULAR; INTRAVENOUS; SUBCUTANEOUS AS NEEDED
Status: DISCONTINUED | OUTPATIENT
Start: 2022-04-18 | End: 2022-04-18 | Stop reason: HOSPADM

## 2022-04-18 RX ORDER — FENTANYL CITRATE 50 UG/ML
INJECTION, SOLUTION INTRAMUSCULAR; INTRAVENOUS AS NEEDED
Status: DISCONTINUED | OUTPATIENT
Start: 2022-04-18 | End: 2022-04-18 | Stop reason: HOSPADM

## 2022-04-18 RX ORDER — SODIUM CHLORIDE, SODIUM LACTATE, POTASSIUM CHLORIDE, CALCIUM CHLORIDE 600; 310; 30; 20 MG/100ML; MG/100ML; MG/100ML; MG/100ML
100 INJECTION, SOLUTION INTRAVENOUS CONTINUOUS
Status: DISCONTINUED | OUTPATIENT
Start: 2022-04-18 | End: 2022-04-18 | Stop reason: HOSPADM

## 2022-04-18 RX ORDER — DIPHENHYDRAMINE HYDROCHLORIDE 50 MG/ML
12.5 INJECTION, SOLUTION INTRAMUSCULAR; INTRAVENOUS
Status: DISCONTINUED | OUTPATIENT
Start: 2022-04-18 | End: 2022-04-18 | Stop reason: HOSPADM

## 2022-04-18 RX ORDER — SODIUM CHLORIDE 0.9 % (FLUSH) 0.9 %
5-40 SYRINGE (ML) INJECTION EVERY 8 HOURS
Status: DISCONTINUED | OUTPATIENT
Start: 2022-04-18 | End: 2022-04-18 | Stop reason: HOSPADM

## 2022-04-18 RX ORDER — OXYCODONE HYDROCHLORIDE 5 MG/1
5 TABLET ORAL
Status: COMPLETED | OUTPATIENT
Start: 2022-04-18 | End: 2022-04-18

## 2022-04-18 RX ORDER — ACETAMINOPHEN 500 MG
1000 TABLET ORAL ONCE
Status: COMPLETED | OUTPATIENT
Start: 2022-04-18 | End: 2022-04-18

## 2022-04-18 RX ORDER — EPHEDRINE SULFATE/0.9% NACL/PF 50 MG/5 ML
SYRINGE (ML) INTRAVENOUS AS NEEDED
Status: DISCONTINUED | OUTPATIENT
Start: 2022-04-18 | End: 2022-04-18 | Stop reason: HOSPADM

## 2022-04-18 RX ORDER — FLUMAZENIL 0.1 MG/ML
0.2 INJECTION INTRAVENOUS
Status: DISCONTINUED | OUTPATIENT
Start: 2022-04-18 | End: 2022-04-18 | Stop reason: HOSPADM

## 2022-04-18 RX ORDER — ONDANSETRON 2 MG/ML
INJECTION INTRAMUSCULAR; INTRAVENOUS AS NEEDED
Status: DISCONTINUED | OUTPATIENT
Start: 2022-04-18 | End: 2022-04-18 | Stop reason: HOSPADM

## 2022-04-18 RX ORDER — ROCURONIUM BROMIDE 10 MG/ML
INJECTION, SOLUTION INTRAVENOUS AS NEEDED
Status: DISCONTINUED | OUTPATIENT
Start: 2022-04-18 | End: 2022-04-18 | Stop reason: HOSPADM

## 2022-04-18 RX ORDER — LIDOCAINE HYDROCHLORIDE 20 MG/ML
INJECTION, SOLUTION EPIDURAL; INFILTRATION; INTRACAUDAL; PERINEURAL AS NEEDED
Status: DISCONTINUED | OUTPATIENT
Start: 2022-04-18 | End: 2022-04-18 | Stop reason: HOSPADM

## 2022-04-18 RX ORDER — MIDAZOLAM HYDROCHLORIDE 1 MG/ML
2 INJECTION, SOLUTION INTRAMUSCULAR; INTRAVENOUS ONCE
Status: DISCONTINUED | OUTPATIENT
Start: 2022-04-18 | End: 2022-04-18 | Stop reason: HOSPADM

## 2022-04-18 RX ORDER — OXYCODONE AND ACETAMINOPHEN 7.5; 325 MG/1; MG/1
1 TABLET ORAL
Qty: 15 TABLET | Refills: 0 | Status: SHIPPED | OUTPATIENT
Start: 2022-04-18 | End: 2022-04-23

## 2022-04-18 RX ORDER — HYDROMORPHONE HYDROCHLORIDE 2 MG/ML
0.5 INJECTION, SOLUTION INTRAMUSCULAR; INTRAVENOUS; SUBCUTANEOUS
Status: DISCONTINUED | OUTPATIENT
Start: 2022-04-18 | End: 2022-04-18 | Stop reason: HOSPADM

## 2022-04-18 RX ADMIN — HYDROMORPHONE HYDROCHLORIDE 0.5 MG: 2 INJECTION, SOLUTION INTRAMUSCULAR; INTRAVENOUS; SUBCUTANEOUS at 14:48

## 2022-04-18 RX ADMIN — Medication 2 G: at 13:30

## 2022-04-18 RX ADMIN — Medication 15 MG: at 13:27

## 2022-04-18 RX ADMIN — HYDROMORPHONE HYDROCHLORIDE 0.5 MG: 2 INJECTION, SOLUTION INTRAMUSCULAR; INTRAVENOUS; SUBCUTANEOUS at 14:33

## 2022-04-18 RX ADMIN — ACETAMINOPHEN 1000 MG: 500 TABLET ORAL at 12:40

## 2022-04-18 RX ADMIN — OXYCODONE 5 MG: 5 TABLET ORAL at 14:44

## 2022-04-18 RX ADMIN — SODIUM CHLORIDE, SODIUM LACTATE, POTASSIUM CHLORIDE, AND CALCIUM CHLORIDE: 600; 310; 30; 20 INJECTION, SOLUTION INTRAVENOUS at 13:07

## 2022-04-18 RX ADMIN — Medication 3 AMPULE: at 12:40

## 2022-04-18 RX ADMIN — FENTANYL CITRATE 100 MCG: 50 INJECTION INTRAMUSCULAR; INTRAVENOUS at 13:14

## 2022-04-18 RX ADMIN — ROCURONIUM BROMIDE 50 MG: 50 INJECTION, SOLUTION INTRAVENOUS at 13:14

## 2022-04-18 RX ADMIN — LIDOCAINE HYDROCHLORIDE 100 MG: 20 INJECTION, SOLUTION EPIDURAL; INFILTRATION; INTRACAUDAL; PERINEURAL at 13:14

## 2022-04-18 RX ADMIN — SUGAMMADEX 200 MG: 100 INJECTION, SOLUTION INTRAVENOUS at 13:59

## 2022-04-18 RX ADMIN — ONDANSETRON 4 MG: 2 INJECTION INTRAMUSCULAR; INTRAVENOUS at 13:44

## 2022-04-18 RX ADMIN — GLYCOPYRROLATE 0.1 MG: 0.2 INJECTION, SOLUTION INTRAMUSCULAR; INTRAVENOUS at 13:28

## 2022-04-18 RX ADMIN — PROPOFOL 200 MG: 10 INJECTION, EMULSION INTRAVENOUS at 13:14

## 2022-04-18 NOTE — OP NOTES
[facility]  OPERATIVE REPORT    Name:  Sammy Batista  MR#:  [id number]  :  1965  ACCOUNT #:  [de-identified]  DATE OF SERVICE:  2022    PREOPERATIVE DIAGNOSIS:  Acute wedge compression fracture, L4 vertebral body. POSTOPERATIVE DIAGNOSIS:  Acute wedge compression fracture, L4 vertebral body. PROCEDURES PERFORMED:  1. L4 kyphoplasty. 2.  L4 vertebral body bone biopsy. 3.  Continuous intraoperative fluoroscopy. SURGEON:  Amara Huynh MD    ASSISTANT:  None. ANESTHESIA:  General endotracheal.    COMPLICATIONS:  None. SPECIMENS REMOVED:  L4 vertebral body bone biopsy. IMPLANTS:  See below. ESTIMATED BLOOD LOSS:  Minimal.    PREPARATION:  ChloraPrep. HISTORY OF PRESENT ILLNESS:  A 58-year-old lady with osteoporosis status post fall down a flight of stairs two months ago with persistent back pain despite bracing, medications and therapy. MRI scanning confirmed an acute wedge compression fracture of the L4 vertebral body with superior endplate compromise. No retropulsion. The patient was admitted for surgery as conservative measures have failed. PROCEDURE:  The patient was brought to the operating room, was carefully placed under general endotracheal anesthesia without complications, carefully turned prone on the OSI bed on top of the Henry frame. AP and lateral fluoroscopy were used to locate the pedicles of L4 bilaterally and they were marked. The entire back was prepped and draped in usual sterile fashion. The pedicle marks were infiltrated with 1% Xylocaine with epinephrine. Stab incisions were made bilaterally with a 15-blade and docking trocars were placed first on the right then on the left through the pedicle into the vertebral body confirmed by fluoroscopy. The vertebral body bone biopsy forceps was placed through the trocar on the left side and a large core biopsy was obtained and sent for permanent pathological identification.   Balloons were placed and counter inflated to a PSI of 200-300. The balloons were deflated. The 7.5 mL of methyl methacrylate cement was packed in 1.5 mL increments with good cross filling of the fracture noted, especially inferiorly. No extravasation, although there was some loss of cement into the disk space between L3 and L4 without retropulsion. After completion of the fill, the docking trocars were removed without tail of cement noted. Final x-rays were obtained which confirmed good position of the methyl methacrylate. The docking trocars were removed. Sterile staples and dressings were placed. The patient tolerated the procedure well, was turned supine, awakened, extubated, and taken to PACU in stable condition. There were no obvious complications.       Marylou Morel MD      [id number]  D:  04/18/2022 14:09  T:  04/18/2022 16:24  JOB #:  [id number]

## 2022-04-18 NOTE — ANESTHESIA PREPROCEDURE EVALUATION
Anesthetic History   No history of anesthetic complications            Review of Systems / Medical History  Patient summary reviewed    Pulmonary          Smoker (quit 5 years)         Neuro/Psych         Psychiatric history     Cardiovascular                  Exercise tolerance: <4 METS: Due to back pain and arthritis      GI/Hepatic/Renal     GERD: well controlled      Liver disease     Endo/Other        Arthritis     Other Findings   Comments: Hx serotonin syndrome    Chronic back pain on chronic opioids    Cocaine abuse in the past, states none in > 10 yrs           Physical Exam    Airway  Mallampati: I  TM Distance: > 6 cm  Neck ROM: normal range of motion   Mouth opening: Normal     Cardiovascular  Regular rate and rhythm,  S1 and S2 normal,  no murmur, click, rub, or gallop  Rhythm: regular  Rate: normal         Dental    Dentition: Upper partial plate     Pulmonary  Breath sounds clear to auscultation               Abdominal  GI exam deferred       Other Findings            Anesthetic Plan    ASA: 3  Anesthesia type: general  ETT        Induction: Intravenous  Anesthetic plan and risks discussed with: Patient

## 2022-04-18 NOTE — DISCHARGE INSTRUCTIONS
Kyphoplasty Discharge Instructions    General information: This procedure is done to help with the back pain that is associated with compression fractures in the spine. The Kyphoplasty involves placing a balloon into the space of the vertebrae that is fractured, blowing up the balloon, therefore realigning the broken pieces of bone, and then injecting cement into the space to strengthen the vertebrae. The pain experienced from compression fractures is caused by the vertebrae not being stabilized. The cement stabilizes the bone, therefore reducing the pain. Home care instructions: You can resume your regular diet and medication regimen. Do not lift anything heavier than a gallon of milk or do anything strenuous for the next 24 hours. You will notice a dressing on your lower back after your procedure. This dressing can be removed in 24 hours. Showering is acceptable in 24 hours but you should refrain from tub baths or swimming for 5 days. Call if:   You should call your physician if you have any bleeding other than a small spot on your bandage. Call if you have any signs of infection, fever, or increased pain at the site. Call if you should have new or worsening pain in your back, or if you lose control of your bladder or bowel. Any tingling or loss of feeling or movement in your legs should also be reported. Medication Interaction:  During your procedure you potentially received a medication or medications which may reduce the effectiveness of oral contraceptives. Please consider other forms of contraception for 1 month following your procedure if you are currently using oral contraceptives as your primary form of birth control. In addition to this, we recommend continuing your oral contraceptive as prescribed, unless otherwise instructed by your physician, during this time.     After general anesthesia or intravenous sedation, for 24 hours or while taking prescription Narcotics:  · Limit your activities  · A responsible adult needs to be with you for the next 24 hours  · Do not drive and operate hazardous machinery  · Do not make important personal or business decisions  · Do not drink alcoholic beverages  · If you have not urinated within 8 hours after discharge, and you are experiencing discomfort from urinary retention, please go to the nearest ED. · If you have sleep apnea and have a CPAP machine, please use it for all naps and sleeping. · Please use caution when taking narcotics and any of your home medications that may cause drowsiness. *  Please give a list of your current medications to your Primary Care Provider. *  Please update this list whenever your medications are discontinued, doses are      changed, or new medications (including over-the-counter products) are added. *  Please carry medication information at all times in case of emergency situations. These are general instructions for a healthy lifestyle:  No smoking/ No tobacco products/ Avoid exposure to second hand smoke  Surgeon General's Warning:  Quitting smoking now greatly reduces serious risk to your health. Obesity, smoking, and sedentary lifestyle greatly increases your risk for illness  A healthy diet, regular physical exercise & weight monitoring are important for maintaining a healthy lifestyle    You may be retaining fluid if you have a history of heart failure or if you experience any of the following symptoms:  Weight gain of 3 pounds or more overnight or 5 pounds in a week, increased swelling in our hands or feet or shortness of breath while lying flat in bed. Please call your doctor as soon as you notice any of these symptoms; do not wait until your next office visit.

## 2022-04-18 NOTE — ANESTHESIA POSTPROCEDURE EVALUATION
Procedure(s):  L4 KYPHOPLASTY/SPINOPLASTY AND BONE BIOPSY . general    Anesthesia Post Evaluation      Multimodal analgesia: multimodal analgesia used between 6 hours prior to anesthesia start to PACU discharge  Patient location during evaluation: PACU  Patient participation: complete - patient participated  Level of consciousness: awake and alert  Pain management: adequate  Airway patency: patent  Anesthetic complications: no  Cardiovascular status: acceptable  Respiratory status: acceptable  Hydration status: acceptable  Post anesthesia nausea and vomiting:  controlled  Final Post Anesthesia Temperature Assessment:  Normothermia (36.0-37.5 degrees C)      INITIAL Post-op Vital signs:   Vitals Value Taken Time   /57 04/18/22 1507   Temp 36.2 °C (97.1 °F) 04/18/22 1442   Pulse 68 04/18/22 1508   Resp 14 04/18/22 1507   SpO2 99 % 04/18/22 1508   Vitals shown include unvalidated device data.

## 2022-04-18 NOTE — BRIEF OP NOTE
Brief Postoperative Note    Patient: Na Newby  YOB: 1965  MRN: [id number]    Date of Procedure: 4/18/2022     Pre-Op Diagnosis: Compression fracture of L4 vertebra, [facility] [S32.040A]    Post-Op Diagnosis: SAME     Procedure(s):  L4 KYPHOPLASTY/SPINOPLASTY AND BONE BIOPSY     Surgeon(s):  Allegra Hwang MD    Surgical Assistant: None    Anesthesia: General     Estimated Blood Loss (mL): MINIMAL    Complications: NONE    Specimens:   ID Type Source Tests Collected by Time Destination   1 : L4 BONE BIOPSY  Preservative Spine  Allegra Hwang MD 4/18/2022 0046 Pathology        Implants:   Implant Name Type Inv.  Item Serial No.  Lot No. LRB No. Used Action   CEMENT BNE Tanya Josh - [id number]  CEMENT BNE Silverio HENDRICKSON_ [id number] N/A 1 Implanted       Drains: * No LDAs found *    Findings: GOOD FILL    Electronically Signed by Ney Albert MD on 4/18/2022 at 2:03 PM

## 2022-05-03 ENCOUNTER — HOSPITAL ENCOUNTER (OUTPATIENT)
Dept: GENERAL RADIOLOGY | Age: 57
Discharge: HOME | End: 2022-05-03
Payer: MEDICARE

## 2022-05-03 DIAGNOSIS — S32.040A COMPRESSION FRACTURE OF L4 VERTEBRA, INITIAL ENCOUNTER (HCC): ICD-10-CM

## 2022-05-03 PROCEDURE — 72100 X-RAY EXAM L-S SPINE 2/3 VWS: CPT

## 2022-05-03 PROCEDURE — 72072 X-RAY EXAM THORAC SPINE 3VWS: CPT

## 2022-05-20 NOTE — THERAPY DISCHARGE
Bret Smith  : 1965  Primary: Deja Livingston Medicare Choice *  Secondary:  [address] at [facility]  [address], [address] [place] [address], [place], [address] [place] [address]  [phone]   [phone]       OUTPATIENT PHYSICAL THERAPY:Discontinuation Summary 2022    ICD-10: Treatment Diagnosis: Painful orthopaedic hardware [facility]) [H24.21OT]               Treatment Diagnosis 2: Left ankle pain, unspecified chronicity [M25.572]               Treatment Diagnosis 3: Other abnormalities of gait and mobility [R26.89]   Precautions: FALL RISK  Allergies: Prolia [denosumab], A to z, Ambien [zolpidem], Amitriptyline, Chantix [varenicline], Gabapentin, Other medication, Tegretol [carbamazepine], Trazodone, and Prednisone   TREATMENT PLAN:  Effective Dates: 2021 TO 2022 (60 days). Frequency/Duration: 1-2 times a week for 60 Day(s) MEDICAL/REFERRING DIAGNOSIS:  Pain due to internal orthopedic prosthetic devices, implants and grafts, initial encounter [T84.84XA]  Pain in left ankle and joints of left foot [M25.572]   DATE OF ONSET: 2021: left revision double level bunionectomy, left calcaneal autograft harvest.                              2021: 2nd/3rd IP joint fusion and bunionectomy  REFERRING PHYSICIAN: Horace Melendez, *  MD Orders: Evaluate and Treat  Return MD Appointment: 2021      INITIAL ASSESSMENT:  Ms. Mor Acevedo is a 61 y.o. female presenting to physical therapy with complaints of debilitating L ankle and foot pain that has continued after having surgery in 2021 and then again in 2021. She reports the first two weeks after the last surgery she felt really good but then started to feel worse as part of the hardware started to loosen. She states that after having that part of the hardware removed 10/25/2021 in office her pain has worsened and her ankle has been swollen throughout the entire process.  She came for physical therapy for previous episode of care which she reports was not helpful for managing her pain. She reports being frustrated and discouraged by her continued severe pain and functional limitations and is concerned that the pain will never resolve. She is currently wearing an ankle brace for stability and using a straight cane for ambulation. She reports having three falls over the last few months and that she staggered backwards and her back has been hurting ever since. Patient presents with increased pain, decreased strength, decreased ROM, decreased flexibility, impaired gait, impaired transfer ability, decreased activity tolerance, and overall impaired functional mobility. Patient is a good candidate for skilled physical therapy interventions to include manual therapy, therapeutic exercise, balance training, gait training, transfer training, postural re-education, body mechanics training, and pain modalities as needed. DISCONTINUATION SUMMARY 05/20/22: Ms. Maynor Grullon was seen for 7 sessions of physical therapy from 11/23/22 to 01/11/22 with some success. She did not return for additional visits so goals unable to be reassessed. She will be discharged at this time for this reason. PROBLEM LIST (Impacting functional limitations):  1. Decreased Strength  2. Decreased ADL/Functional Activities  3. Decreased Transfer Abilities  4. Decreased Ambulation Ability/Technique  5. Decreased Balance  6. Increased Pain  7. Decreased Activity Tolerance  8. Increased Fatigue  9. Decreased Flexibility/Joint Mobility  10. Edema/Girth  11. Decreased Skin Integrity/Hygeine  12. Decreased [place] with Home Exercise Program INTERVENTIONS PLANNED: (Treatment may consist of any combination of the following)  1. Balance Exercise  2. Cold  3. Cryotherapy  4. Family Education  5. Gait Training  6. Heat  7. Home Exercise Program (HEP)  8. Manual Therapy  9. Neuromuscular Re-education/Strengthening  10.  Range of Motion (ROM)  11. Therapeutic Activites  12. Therapeutic Exercise/Strengthening  13. Transfer Training     GOALS: (Goals have been discussed and agreed upon with patient.)  Short-Term Functional Goals: Time Frame: 11/23/2021 to 12/23/2021  1. Patient demonstrates independence with home exercise program without verbal cueing provided by therapist. (NOT MET)  2. Patient will report no more than 5/10 ankle pain at rest in order to demonstrate improved self pain control and tolerance. (NOT MET)  3. Patient will ambulate her required distances safely with straight cane and step through gait pattern. (NOT MET)  Discharge Goals: Time Frame: 11/23/2021 to 01/23/2021  1. Patient will walk her required distances with minimal pain and no evidence of imbalance without assistive device. (NOT MET)  2. Patient will negotiate steps reciprocally with one hand rail safely and with minimal pain. (NOT MET)  3. Patient will demonstrate L ankle ROM equal to the R to facilitate more normalized gait pattern and decrease pain. (NOT MET)  4. Patient will demonstrate L ankle strength 4/5 or better in all planes. (NOT MET)  5. Patient will improve Foot and Ankle Ability Measure score to 80/116 from 36/116. (NOT MET)    Outcome Measure: Tool Used: PT/OT FOOT AND ANKLE ABILITY MEASURE  Score:  Initial: 36/116 Most Recent: X (Date: -- )   Interpretation of Score: For the \"Activities of Daily Living\", there are 21 questions each scored on a 5 point scale with 0 representing \"Unable to do\" and 4 representing \"No difficulty\". The lower the score, the greater the functional disability. 84/84 represents no disability. Minimal detectable change is 5.7 points. With the addition of the 8 questions in the \"Sports Subscale,\" there are 29 questions, each scored on a 5 point scale with 0 representing \"Unable to do\" and 4 representing \"No difficulty\". The lower the score, the greater the functional disability. 116/116 represents no disability.   Minimal detectable change is 12.3 points. Patient denies any LE paresthesia. Patient denies any increase of symptoms with cough, sneeze or valsalva. Patient denies any saddle paresthesia or bowel/bladder deficits. Observation/Orthostatic Postural Assessment:          Patient demonstrating antalgic posture in static standing; presents with ankle brace today. L ankle and foot appear grossly     Palpation:          Patient is tender to palpation around dorsum of L foot; peroneal tendons, tibialis posterior tendons. ROM:    AROM/ PROM Left (degrees) Right (degrees)   Plantarflexion (PF) 30 40   Dorsiflexion (DF) 5 5   Inversion 15 25   Eversion 0 0   Great toe extension NT NT   Great toe flexion NT NT     Strength: Motion Tested Left   (*/5) Right  (*/5)   Dorsiflexion 4 5   Plantarflexion 3 3   Inversion 3 3   Eversion 3 3   Great Toe Extension NT NT   Great Toe Flexion NT NT     Special Tests:          None performed due to nature of surgeries. Passive Accessory Motion: To be tested at next visit due to limited time this session. Neurological Screen:              Myotomes: Key muscle strength testing through bilateral LE is intact; weakness does not follow myotome pattern. Dermatomes: Sensation to light touch for bilateral LE is intact except for areas over incisions slightly impaired. Functional Mobility:   Patient ambulates with straight cane; evidence of instability with decreased stance time and weight bearing on the L. Performs transfers with difficulty using BUEs.      Balance:          Impaired based on observation and patient history; patient demonstrating evidence of imbalance when walking with straight cane; staggered backward and required assistance to prevent fall during exam.       Medical Necessity:   · Patient is expected to demonstrate progress in strength, range of motion, balance, coordination and functional technique to decrease pain and improve safety during functional mobility and ADLs. · Skilled intervention continues to be required due to need for exercise progressions and manual therapy tailored to patient needs. .  Reason for Services/Other Comments:  · Patient continues to require skilled intervention due to need for plan of care modifications and exceptions tailored to patient goals and impairments . Rehabilitation Potential For Stated Goals: Good  Regarding Laura Levi's therapy, I certify that the treatment plan above will be carried out by a therapist or under their direction. Thank you for this referral,  Carlos Singh, PT    Referring Physician Signature: Daryl Ramos, * No Signature is Required for this note.

## 2022-05-25 ENCOUNTER — OFFICE VISIT (OUTPATIENT)
Dept: NEUROSURGERY | Age: 57
End: 2022-05-25
Payer: MEDICARE

## 2022-05-25 VITALS
HEART RATE: 85 BPM | TEMPERATURE: 98 F | OXYGEN SATURATION: 95 % | BODY MASS INDEX: 28.17 KG/M2 | DIASTOLIC BLOOD PRESSURE: 62 MMHG | SYSTOLIC BLOOD PRESSURE: 108 MMHG | WEIGHT: 165 LBS | HEIGHT: 64 IN

## 2022-05-25 DIAGNOSIS — S32.040D COMPRESSION FRACTURE OF L4 VERTEBRA WITH ROUTINE HEALING, SUBSEQUENT ENCOUNTER: Primary | ICD-10-CM

## 2022-05-25 PROCEDURE — 4004F PT TOBACCO SCREEN RCVD TLK: CPT | Performed by: NEUROLOGICAL SURGERY

## 2022-05-25 PROCEDURE — 99213 OFFICE O/P EST LOW 20 MIN: CPT | Performed by: NEUROLOGICAL SURGERY

## 2022-05-25 PROCEDURE — G8419 CALC BMI OUT NRM PARAM NOF/U: HCPCS | Performed by: NEUROLOGICAL SURGERY

## 2022-05-25 PROCEDURE — G8427 DOCREV CUR MEDS BY ELIG CLIN: HCPCS | Performed by: NEUROLOGICAL SURGERY

## 2022-05-25 PROCEDURE — 3017F COLORECTAL CA SCREEN DOC REV: CPT | Performed by: NEUROLOGICAL SURGERY

## 2022-05-25 ASSESSMENT — PATIENT HEALTH QUESTIONNAIRE - PHQ9
SUM OF ALL RESPONSES TO PHQ QUESTIONS 1-9: 0
SUM OF ALL RESPONSES TO PHQ QUESTIONS 1-9: 0
7. TROUBLE CONCENTRATING ON THINGS, SUCH AS READING THE NEWSPAPER OR WATCHING TELEVISION: 0
4. FEELING TIRED OR HAVING LITTLE ENERGY: 0
SUM OF ALL RESPONSES TO PHQ QUESTIONS 1-9: 0
8. MOVING OR SPEAKING SO SLOWLY THAT OTHER PEOPLE COULD HAVE NOTICED. OR THE OPPOSITE, BEING SO FIGETY OR RESTLESS THAT YOU HAVE BEEN MOVING AROUND A LOT MORE THAN USUAL: 0
SUM OF ALL RESPONSES TO PHQ9 QUESTIONS 1 & 2: 0
5. POOR APPETITE OR OVEREATING: 0
2. FEELING DOWN, DEPRESSED OR HOPELESS: 0
SUM OF ALL RESPONSES TO PHQ QUESTIONS 1-9: 0
3. TROUBLE FALLING OR STAYING ASLEEP: 0
10. IF YOU CHECKED OFF ANY PROBLEMS, HOW DIFFICULT HAVE THESE PROBLEMS MADE IT FOR YOU TO DO YOUR WORK, TAKE CARE OF THINGS AT HOME, OR GET ALONG WITH OTHER PEOPLE: 0
6. FEELING BAD ABOUT YOURSELF - OR THAT YOU ARE A FAILURE OR HAVE LET YOURSELF OR YOUR FAMILY DOWN: 0
1. LITTLE INTEREST OR PLEASURE IN DOING THINGS: 0
9. THOUGHTS THAT YOU WOULD BE BETTER OFF DEAD, OR OF HURTING YOURSELF: 0

## 2023-06-26 ENCOUNTER — HOSPITAL ENCOUNTER (OUTPATIENT)
Dept: GENERAL RADIOLOGY | Age: 58
Discharge: HOME | End: 2023-06-29
Payer: MEDICARE

## 2023-06-26 DIAGNOSIS — M54.14 COMPRESSION OF THORACIC NERVE ROOT: ICD-10-CM

## 2023-06-26 PROCEDURE — 72072 X-RAY EXAM THORAC SPINE 3VWS: CPT

## 2024-05-22 ENCOUNTER — OFFICE VISIT (OUTPATIENT)
Dept: NEUROSURGERY | Age: 59
End: 2024-05-22

## 2024-05-22 VITALS
DIASTOLIC BLOOD PRESSURE: 70 MMHG | TEMPERATURE: 97.8 F | SYSTOLIC BLOOD PRESSURE: 144 MMHG | HEART RATE: 49 BPM | BODY MASS INDEX: 26.12 KG/M2 | WEIGHT: 153 LBS | OXYGEN SATURATION: 97 % | HEIGHT: 64 IN

## 2024-05-22 DIAGNOSIS — M54.59 LUMBAR TRIGGER POINT SYNDROME: Primary | ICD-10-CM

## 2024-05-22 RX ORDER — OXYCODONE AND ACETAMINOPHEN 7.5; 325 MG/1; MG/1
1 TABLET ORAL EVERY 6 HOURS PRN
COMMUNITY
Start: 2023-04-06

## 2024-05-22 RX ORDER — FAMOTIDINE 40 MG/1
40 TABLET, FILM COATED ORAL DAILY
COMMUNITY
Start: 2024-02-27 | End: 2025-02-26

## 2024-05-22 RX ORDER — ACETAMINOPHEN 500 MG
TABLET ORAL
COMMUNITY
Start: 2022-09-06

## 2024-05-22 RX ORDER — IBUPROFEN 800 MG/1
800 TABLET ORAL EVERY 8 HOURS PRN
COMMUNITY
Start: 2022-01-31

## 2024-05-22 RX ORDER — MIRABEGRON 50 MG/1
50 TABLET, EXTENDED RELEASE ORAL DAILY
COMMUNITY
Start: 2024-02-27

## 2024-05-22 RX ORDER — NALOXONE HYDROCHLORIDE 4 MG/.1ML
1 SPRAY NASAL
COMMUNITY
Start: 2021-09-23

## 2024-05-22 RX ORDER — GUANFACINE 2 MG/1
TABLET, EXTENDED RELEASE ORAL 2 TIMES DAILY
COMMUNITY

## 2024-05-22 RX ORDER — ONDANSETRON HYDROCHLORIDE 8 MG/1
4-8 TABLET, FILM COATED ORAL EVERY 8 HOURS PRN
COMMUNITY
Start: 2023-12-04

## 2024-05-22 NOTE — PROGRESS NOTES
[facility] OFFICE NOTE:         CC: Back pain    History of Present Illness:  Jessica Montes (1965) is a 59 y.o.  female who was referred by Ambar Cornell MD for evaluation of back pain and abnormal MRI.  She suffered a wedge fracture of L4 and underwent a kyphoplasty by Dr. Brewster in 4/18/2022.  She has continued to have back pain with really no radicular component to her recent MRI and is here for my evaluation.    HPI      Past Medical History:   Diagnosis Date    Allergic dermatitis     Alterations of sensations     Anxiety and depression 7/21/2016    Arthritis     arthritis of both knees    Back pain 7/21/2016    Bleeds easily (HCC) 04/15/2022    PER PATIENT, DENIES HX OF ANEMIA OR CLOTTING DISORDER    Carpal tunnel syndrome 7/21/2016    Chronic hepatitis C without hepatic coma (HCC) 7/21/2016    TREATED 2018 Simeprevir (Olysio)and sofosbuvir (Sovaldi)    Chronic pain 7/21/2016    Contact dermatitis     COVID-19 vaccine series completed 05/05/2021    Pfizer    CTS (carpal tunnel syndrome)     Depression     Fatigue 7/21/2016    GERD (gastroesophageal reflux disease)     NO CURRENT MEDS 4/15/22- STATES MILD AND SELDOM    Hot flashes     Hyperlipidemia 7/21/2016    Insomnia 7/21/2016    Lymphadenitis     Menopause 7/21/2016    Nausea 7/21/2016    Obesity 7/21/2016    Orbital osteo-periostitis     Osteoporosis 7/21/2016    Plantar fasciitis     Postmenopausal disorder     Psychiatric disorder     Reactive hypoglycemia     Restless leg 7/21/2016    Serotonin syndrome     Thrush     Tremor     Weakness     Xerostomia       Past Surgical History:   Procedure Laterality Date    BUNIONECTOMY Left 09/13/2021, 6/3/21    CARPAL TUNNEL RELEASE Bilateral     CYST REMOVAL      mouth    OTHER SURGICAL HISTORY  11/6/98    Liver biopsy, chronic hepatitis with minimal periportal inflammation and minimal portal fibrosis     TONSILLECTOMY AND ADENOIDECTOMY      TUBAL LIGATION        Current

## 2024-07-12 DIAGNOSIS — M54.9 BACK PAIN, UNSPECIFIED BACK LOCATION, UNSPECIFIED BACK PAIN LATERALITY, UNSPECIFIED CHRONICITY: Primary | ICD-10-CM

## 2024-07-24 PROBLEM — M54.59 LUMBAR TRIGGER POINT SYNDROME: Status: ACTIVE | Noted: 2024-07-24

## 2024-07-24 NOTE — ASSESSMENT & PLAN NOTE
Chest significantly tender bilateral iliolumbar trigger points at the posterior superior iliac spine.  I would like for her to go back to work pain physician to have those injected and then if that does not take care of her problem I will be glad to see her back.

## 2024-07-24 NOTE — PROGRESS NOTES
History of Present Illness    Patient Words: 61 y.o. This patient is a 61 y.o. female who presents today for postsurgical follow-up status post L4 kyphoplasty and bone biopsy. Patient sustained a fall 2 weeks postop and underwent thoracic and lumbar spine x-rays which showed no new fractures, no disruption of the cement, and significant thoracolumbar scoliosis. She has had physical therapy in the past and currently does see pain management.   Severe back pain from the fracture has improved with her chronic back pain persist  Past Medical History:   Diagnosis Date    Allergic dermatitis     Alterations of sensations     Anxiety and depression 7/21/2016    Arthritis     arthritis of both knees    Back pain 7/21/2016    Bleeds easily ([facility] [address] 04/15/2022    PER PATIENT, DENIES HX OF ANEMIA OR CLOTTING DISORDER    Carpal tunnel syndrome 7/21/2016    Chronic hepatitis C without hepatic coma ([facility] [address] 7/21/2016    TREATED 2018 Simeprevir (Olysio)and sofosbuvir (Sovaldi)    Chronic pain 7/21/2016    Contact dermatitis     COVID-19 vaccine series completed 05/05/2021    Pfizer    CTS (carpal tunnel syndrome)     Depression     Fatigue 7/21/2016    GERD (gastroesophageal reflux disease)     NO CURRENT MEDS 4/15/22- STATES MILD AND SELDOM    Hot flashes     Hyperlipidemia 7/21/2016    Insomnia 7/21/2016    Lymphadenitis     Menopause 7/21/2016    Nausea 7/21/2016    Obesity 7/21/2016    Orbital osteo-periostitis     Osteoporosis 7/21/2016    Plantar fasciitis     Postmenopausal disorder     Psychiatric disorder     Reactive hypoglycemia     Restless leg 7/21/2016    Serotonin syndrome     Thrush     Tremor     Weakness     Xerostomia         Allergies   Allergen Reactions    Denosumab Other (See Comments)     She had jaw pain    Trazodone Other (See Comments)     Serotonin syndrome    Varenicline Other (See Comments)     \"make me crazy\"/homicidal-took it a couple of weeks    Zolpidem Other Post-Op Assessment Note    CV Status:  Stable  Pain Score: 0    Pain management: adequate       Mental Status:  Alert and sleepy   Hydration Status:  Euvolemic   PONV Controlled:  Controlled   Airway Patency:  Patent     Post Op Vitals Reviewed: Yes    No anethesia notable event occurred.    Staff: CRNA               BP   113/74   Temp   97   Pulse  60   Resp   18   SpO2   99      (See Comments)     Nighttime eating with no memory    Amitriptyline Anxiety, Other (See Comments), Nausea Only and Palpitations    Gabapentin Anxiety, Other (See Comments), Nausea And Vomiting and Palpitations        Family History   Problem Relation Age of Onset    Diabetes Paternal Grandmother     Cancer Maternal Grandfather         Sinus    Cancer Maternal Grandmother         sinus    Other Father         Bypass surgery, heart rhythm abnormality getting defibrillator     Lung Disease Father     Cancer Paternal Grandfather         Sinus    Cancer Paternal Grandmother         Sinus    Heart Disease Father     Diabetes Son         Social History     Socioeconomic History    Marital status:      Spouse name: Not on file    Number of children: Not on file    Years of education: Not on file    Highest education level: Not on file   Occupational History    Not on file   Tobacco Use    Smoking status: Former Smoker     Packs/day: 1.00     Quit date: 1/28/2012     Years since quitting: 10.3    Smokeless tobacco: Never Used    Tobacco comment: Quit smoking: uses e-cigs. every day. Substance and Sexual Activity    Alcohol use: No     Alcohol/week: 0.0 standard drinks    Drug use: No    Sexual activity: Not on file   Other Topics Concern    Not on file   Social History Narrative    Not on file     Social Determinants of Health     Financial Resource Strain:     Difficulty of Paying Living Expenses: Not on file   Food Insecurity:     Worried About Running Out of Food in the Last Year: Not on file    Nehemias of Food in the Last Year: Not on file   Transportation Needs:     Lack of Transportation (Medical): Not on file    Lack of Transportation (Non-Medical):  Not on file   Physical Activity:     Days of Exercise per Week: Not on file    Minutes of Exercise per Session: Not on file   Stress:     Feeling of Stress : Not on file   Social Connections:     Frequency of Communication with Friends and Family: Not on file    Frequency of Social Gatherings with Friends and Family: Not on file    Attends Protestant Services: Not on file    Active Member of Clubs or Organizations: Not on file    Attends Club or Organization Meetings: Not on file    Marital Status: Not on file   Intimate Partner Violence:     Fear of Current or Ex-Partner: Not on file    Emotionally Abused: Not on file    Physically Abused: Not on file    Sexually Abused: Not on file   Housing Stability:     Unable to Pay for Housing in the Last Year: Not on file    Number of [place] in the Last Year: Not on file    Unstable Housing in the Last Year: Not on file       Current Outpatient Medications   Medication Sig Dispense Refill    amphetamine-dextroamphetamine (ADDERALL) 30 MG tablet Take 30 mg by mouth 2 times daily.  baclofen (LIORESAL) 10 MG tablet Take 10 mg by mouth 2 times daily      buPROPion (WELLBUTRIN SR) 150 MG extended release tablet Take 150 mg by mouth      buPROPion (WELLBUTRIN XL) 300 MG extended release tablet Take 300 mg by mouth every morning      celecoxib (CELEBREX) 100 MG capsule Take 100 mg by mouth 2 times daily      clonazePAM (KLONOPIN) 1 MG tablet Take 1 mg by mouth 3 times daily.  Diclofenac Sodium 3 % GEL Apply 2 g topically 4 times daily      FLUoxetine (PROZAC) 20 MG capsule Take 20 mg by mouth      fluticasone (FLONASE) 50 MCG/ACT nasal spray 2 sprays by Nasal route daily as needed      HYDROcodone-acetaminophen (NORCO)  MG per tablet Take 1 tablet by mouth every 6 hours as needed.       Magnesium Oxide 500 MG TABS Take 500 mg by mouth      montelukast (SINGULAIR) 10 MG tablet Take 10 mg by mouth daily      promethazine (PHENERGAN) 25 MG tablet Take 1 tablet by mouth as needed      raloxifene (EVISTA) 60 MG tablet Take 60 mg by mouth daily      rOPINIRole (REQUIP) 1 MG tablet Take 1-2 mg by mouth      valACYclovir (VALTREX) 1 g tablet as needed       No current facility-administered medications for this visit. Patient Active Problem List   Diagnosis    Anxiety and depression    Arthritis    Carpal tunnel syndrome    GERD (gastroesophageal reflux disease)    Insomnia    Nausea    Obesity    Chronic hepatitis C without hepatic coma (HCC)    Osteoporosis    Chronic pain    Hyperlipidemia    Restless leg    Serotonin syndrome    Back pain    Compression fracture of fourth lumbar vertebra (HCC)        Review of Systems: A complete ROS was done and as stated in the HPI or otherwise negative. Physical Exam  The physical exam findings are as follows:Integumentary  Incision healing well without signs of redness, swelling, drainage or dehiscence. Neurologic  Sensory: Normal  Tone: Normal  Strength: 5/5 normal muscle strength- all muscles  Reflexes (Dermatones): 2/2 Normal- All  Plantar Reflexes (L4-S2)- Bilateral- Flexion. Coordination- Normal. Gait- Normal.  Chronic use of a cane    Assessment & Plan  L4 acute wedge compression fracture. Status post successful kyphoplasty. Bone biopsy results are negative. The patient will follow up with pain management. No further surgical intervention indicated at this time. ICD-10-CM    1.  Compression fracture of L4 vertebra with routine healing, subsequent encounter  S32.040D        Etienne Rhodes MD

## 2024-09-17 ENCOUNTER — OFFICE VISIT (OUTPATIENT)
Dept: NEUROSURGERY | Age: 59
End: 2024-09-17
Payer: MEDICARE

## 2024-09-17 VITALS
BODY MASS INDEX: 25.61 KG/M2 | SYSTOLIC BLOOD PRESSURE: 112 MMHG | HEIGHT: 64 IN | DIASTOLIC BLOOD PRESSURE: 55 MMHG | HEART RATE: 72 BPM | TEMPERATURE: 97 F | WEIGHT: 150 LBS | OXYGEN SATURATION: 94 %

## 2024-09-17 DIAGNOSIS — M54.59 LUMBAR TRIGGER POINT SYNDROME: ICD-10-CM

## 2024-09-17 DIAGNOSIS — G89.29 CHRONIC BILATERAL LOW BACK PAIN WITHOUT SCIATICA: Primary | ICD-10-CM

## 2024-09-17 DIAGNOSIS — M54.50 CHRONIC BILATERAL LOW BACK PAIN WITHOUT SCIATICA: Primary | ICD-10-CM

## 2024-09-17 PROCEDURE — 99214 OFFICE O/P EST MOD 30 MIN: CPT | Performed by: NEUROLOGICAL SURGERY

## 2024-09-17 PROCEDURE — 1036F TOBACCO NON-USER: CPT | Performed by: NEUROLOGICAL SURGERY

## 2024-09-17 PROCEDURE — G8419 CALC BMI OUT NRM PARAM NOF/U: HCPCS | Performed by: NEUROLOGICAL SURGERY

## 2024-09-17 PROCEDURE — 3017F COLORECTAL CA SCREEN DOC REV: CPT | Performed by: NEUROLOGICAL SURGERY

## 2024-09-17 PROCEDURE — G8427 DOCREV CUR MEDS BY ELIG CLIN: HCPCS | Performed by: NEUROLOGICAL SURGERY

## 2024-12-05 ENCOUNTER — TELEPHONE (OUTPATIENT)
Dept: NEUROSURGERY | Age: 59
End: 2024-12-05

## 2024-12-05 NOTE — TELEPHONE ENCOUNTER
Patient is requesting a referral to a different pain management clinic. She states the clinic she was referred to [facility] accept her and she has been dismissed from [facility].

## 2024-12-05 NOTE — TELEPHONE ENCOUNTER
Ralph referral to Dr. Velasco office. LVM for pt to be looking out for their phone call to schedule.

## 2025-01-07 ENCOUNTER — OFFICE VISIT (OUTPATIENT)
Dept: NEUROSURGERY | Age: 60
End: 2025-01-07
Payer: MEDICARE

## 2025-01-07 VITALS
BODY MASS INDEX: 25.95 KG/M2 | TEMPERATURE: 97.6 F | HEART RATE: 59 BPM | WEIGHT: 152 LBS | HEIGHT: 64 IN | OXYGEN SATURATION: 96 %

## 2025-01-07 DIAGNOSIS — M54.59 LUMBAR TRIGGER POINT SYNDROME: Primary | ICD-10-CM

## 2025-01-07 PROCEDURE — 99213 OFFICE O/P EST LOW 20 MIN: CPT | Performed by: NEUROLOGICAL SURGERY

## 2025-01-07 PROCEDURE — 1036F TOBACCO NON-USER: CPT | Performed by: NEUROLOGICAL SURGERY

## 2025-01-07 PROCEDURE — G8419 CALC BMI OUT NRM PARAM NOF/U: HCPCS | Performed by: NEUROLOGICAL SURGERY

## 2025-01-07 PROCEDURE — 3017F COLORECTAL CA SCREEN DOC REV: CPT | Performed by: NEUROLOGICAL SURGERY

## 2025-01-07 PROCEDURE — G8427 DOCREV CUR MEDS BY ELIG CLIN: HCPCS | Performed by: NEUROLOGICAL SURGERY

## 2025-01-07 NOTE — PROGRESS NOTES
[facility] OFFICE NOTE:         CC: Routine follow-up for after pain management    History of Present Illness:  Jessica Montes (1965) is a 59 y.o.  female who was last seen in the office 9/17/2024  for low back pain with trigger point tenderness.  She is still having significant low back pain    HPI      Past Medical History:   Diagnosis Date    Allergic dermatitis     Alterations of sensations     Anxiety and depression 7/21/2016    Arthritis     arthritis of both knees    Back pain 7/21/2016    Bleeds easily (HCC) 04/15/2022    PER PATIENT, DENIES HX OF ANEMIA OR CLOTTING DISORDER    Carpal tunnel syndrome 7/21/2016    Chronic hepatitis C without hepatic coma (HCC) 7/21/2016    TREATED 2018 Simeprevir (Olysio)and sofosbuvir (Sovaldi)    Chronic pain 7/21/2016    Contact dermatitis     COVID-19 vaccine series completed 05/05/2021    Pfizer    CTS (carpal tunnel syndrome)     Depression     Difficulty walking 05/24    walking. crooked and back in pain when walking any distance    Fatigue 7/21/2016    GERD (gastroesophageal reflux disease)     NO CURRENT MEDS 4/15/22- STATES MILD AND SELDOM    Hot flashes     Hyperlipidemia 7/21/2016    Insomnia 7/21/2016    Low back pain     Lymphadenitis     Menopause 7/21/2016    Nausea 7/21/2016    Obesity 7/21/2016    Orbital osteo-periostitis     Osteoporosis 7/21/2016    Plantar fasciitis     Postmenopausal disorder     Psychiatric disorder     Reactive hypoglycemia     Restless leg 7/21/2016    Serotonin syndrome     Thoracic disc disorder 05/24    Thrush     Tremor     Weakness     Xerostomia       Past Surgical History:   Procedure Laterality Date    BUNIONECTOMY Left 09/13/2021, 6/3/21    CARPAL TUNNEL RELEASE Bilateral     CYST REMOVAL      mouth    OTHER SURGICAL HISTORY  11/6/98    Liver biopsy, chronic hepatitis with minimal periportal inflammation and minimal portal fibrosis     TONSILLECTOMY AND ADENOIDECTOMY      TUBAL LIGATION

## 2025-01-20 NOTE — ASSESSMENT & PLAN NOTE
She continues to have exquisitely tender iliolumbar trigger points.  We have now started doing injections in the office since we are having trouble getting those arranged as an outpatient otherwise.  We will bring her back into the next they were doing injections

## 2025-01-29 ENCOUNTER — OFFICE VISIT (OUTPATIENT)
Dept: NEUROSURGERY | Age: 60
End: 2025-01-29
Payer: MEDICARE

## 2025-01-29 VITALS
DIASTOLIC BLOOD PRESSURE: 62 MMHG | BODY MASS INDEX: 26.09 KG/M2 | HEART RATE: 55 BPM | SYSTOLIC BLOOD PRESSURE: 125 MMHG | OXYGEN SATURATION: 96 % | TEMPERATURE: 97.2 F | HEIGHT: 64 IN

## 2025-01-29 DIAGNOSIS — M54.59 LUMBAR TRIGGER POINT SYNDROME: Primary | ICD-10-CM

## 2025-01-29 DIAGNOSIS — M79.18 MYALGIA, OTHER SITE: ICD-10-CM

## 2025-01-29 PROCEDURE — 20552 NJX 1/MLT TRIGGER POINT 1/2: CPT | Performed by: NEUROLOGICAL SURGERY

## 2025-01-29 RX ADMIN — METHYLPREDNISOLONE ACETATE 40 MG: 40 INJECTION, SUSPENSION INTRA-ARTICULAR; INTRALESIONAL; INTRAMUSCULAR; SOFT TISSUE at 13:15

## 2025-01-29 NOTE — PROGRESS NOTES
Procedure note:    Iliolumbar trigger point/posterior superior iliac spine injection (11009)    The procedure was explained to the patient in great detail.  All questions were answered.  The patient wishes to proceed with a trigger point junction.    The chart was reviewed and the patient was interviewed to discuss any possible allergies drug interactions or contraindications for the procedure.    ___________________________    Perform maximal tenderness at the bilateral posterior superior iliac spine was identified and marked  The skin was prepped with ChloraPrep  Using sterile technique a skin wheal was made with 1% lidocaine with epinephrine at the area of the skin deena with a 25-gauge needle  The needle was then advanced to identify the point ofmaximal tenderness being able to palpate the tip of the posterior superior iliac spine with a needle.  All around the spike of the bone at the level of the periosteum and in the iliolumbar ligament was infiltrated with 10 cc of 0.5% lidocaine with 1-200,000 epinephrine  With the needle in the same place the syringe was changed and 10 cc of 0.25% Marcaine was infiltrated in a similar fashion.  This was completed with 40 mg of Depo-Medrol.    Procedure was tolerated quite well with no complications.    There was moderate relief in the pain from prior to the procedure.  The patient states that there was 40-75 % of total pain relief.    Patient was watched for 15 minutes for any delayed adverse reaction prior to leaving the office.    Nola Huynh MD [facility]

## 2025-01-30 RX ORDER — METHYLPREDNISOLONE ACETATE 40 MG/ML
40 INJECTION, SUSPENSION INTRA-ARTICULAR; INTRALESIONAL; INTRAMUSCULAR; SOFT TISSUE ONCE
Status: COMPLETED | OUTPATIENT
Start: 2025-01-29 | End: 2025-01-29

## 2025-02-11 NOTE — PROGRESS NOTES
Chronic Pain Consult Note      Plan:     A comprehensive pain management plan may consist of the following: Testing, Therapy, Medications, Interventions, Consults, and Follow up.    Posterior superior iliac spine trigger point  Status post TPI with Dr. Huynh x 1.  Potential second series according to patient at follow-up in 2 weeks  Lumbosacral spondylosis without myelopathy  Schedule bilateral L4 through SA MBB.  Potential need for alteration of BP height depending upon examination of procedure date.      General Recommendations: The pain condition that the patient suffers from is best treated with a multidisciplinary approach that involves an increase in physical activity to prevent de-conditioning and worsening of the pain cycle, as well as psychological counseling (formal and/or informal) to address the co morbid psychological effects of pain. Treatment will often involve judicious use of pain medications and interventional procedures to decrease the pain, allowing the patient to participate in the physical activity that will ultimately produce long-lasting pain reductions. The goal of the multidisciplinary approach is to return the patient to a higher level of overall function and to restore their ability to perform activities of daily living.      Referring Provider: Pérez Huynh*  Assessment:      Chief Complaint: No chief complaint on file.      Jessica Montes is a 59 y.o. female [facility] for the following diagnoses:    Diagnosis:  No diagnosis found.      Subjective:      HPI:  Ms. Montes is seen in consultation at the request of Pérez Huynh* for evaluation and recommendations regarding the above diagnoses and the below HPI.    HPI on02/13/25: 59-year-old female presents for evaluation treatment of low back pain.  Patient reports pains been present since 2022.  Describes recent standing scoliosis and falls down flights of stairs.  Pain is more

## 2025-02-12 ENCOUNTER — OFFICE VISIT (OUTPATIENT)
Dept: NEUROSURGERY | Age: 60
End: 2025-02-12
Payer: MEDICARE

## 2025-02-12 VITALS
HEART RATE: 77 BPM | BODY MASS INDEX: 25.95 KG/M2 | TEMPERATURE: 97.2 F | WEIGHT: 152 LBS | SYSTOLIC BLOOD PRESSURE: 117 MMHG | OXYGEN SATURATION: 98 % | HEIGHT: 64 IN | DIASTOLIC BLOOD PRESSURE: 66 MMHG

## 2025-02-12 DIAGNOSIS — M54.59 LUMBAR TRIGGER POINT SYNDROME: Primary | ICD-10-CM

## 2025-02-12 PROCEDURE — G8427 DOCREV CUR MEDS BY ELIG CLIN: HCPCS | Performed by: NEUROLOGICAL SURGERY

## 2025-02-12 PROCEDURE — 3017F COLORECTAL CA SCREEN DOC REV: CPT | Performed by: NEUROLOGICAL SURGERY

## 2025-02-12 PROCEDURE — 99213 OFFICE O/P EST LOW 20 MIN: CPT | Performed by: NEUROLOGICAL SURGERY

## 2025-02-12 PROCEDURE — 1036F TOBACCO NON-USER: CPT | Performed by: NEUROLOGICAL SURGERY

## 2025-02-12 PROCEDURE — G8419 CALC BMI OUT NRM PARAM NOF/U: HCPCS | Performed by: NEUROLOGICAL SURGERY

## 2025-02-12 NOTE — ASSESSMENT & PLAN NOTE
She still has pain but she looks more comfortable she did have several days of complete pain relief and now it takes a lot more force to evoke the pain at her posterior superior iliac spines.  She was requesting injections again today but I think that I rather see her back in 2 weeks and lets give the steroid some time to work.

## 2025-02-12 NOTE — PROGRESS NOTES
[facility] OFFICE NOTE:         CC: Routine follow-up for  after trigger point injection    History of Present Illness:  Jessica Montes (1965) is a 59 y.o.  female who was last seen in the office 1/29/2025  for bilateral trigger point injections.  She states before the injections her pain was 7 out of 10.  She has some leg numbness for first day but her pain really did not go away during that initial.  But for 3 days she had absolutely no pain.  She states now that her pain is back up to the level of a 7 is a little better today than it was yesterday.    HPI      Past Medical History:   Diagnosis Date    Allergic dermatitis     Alterations of sensations     Anxiety and depression 7/21/2016    Arthritis     arthritis of both knees    Back pain 7/21/2016    Bleeds easily (HCC) 04/15/2022    PER PATIENT, DENIES HX OF ANEMIA OR CLOTTING DISORDER    Carpal tunnel syndrome 7/21/2016    Chronic hepatitis C without hepatic coma (HCC) 7/21/2016    TREATED 2018 Simeprevir (Olysio)and sofosbuvir (Sovaldi)    Chronic pain 7/21/2016    Contact dermatitis     COVID-19 vaccine series completed 05/05/2021    Pfizer    CTS (carpal tunnel syndrome)     Depression     Difficulty walking 05/24    walking. crooked and back in pain when walking any distance    Fatigue 7/21/2016    GERD (gastroesophageal reflux disease)     NO CURRENT MEDS 4/15/22- STATES MILD AND SELDOM    Hot flashes     Hyperlipidemia 7/21/2016    Insomnia 7/21/2016    Low back pain     Lymphadenitis     Menopause 7/21/2016    Nausea 7/21/2016    Obesity 7/21/2016    Orbital osteo-periostitis     Osteoporosis 7/21/2016    Plantar fasciitis     Postmenopausal disorder     Psychiatric disorder     Reactive hypoglycemia     Restless leg 7/21/2016    Serotonin syndrome     Thoracic disc disorder 05/24    Thrush     Tremor     Weakness     Xerostomia       Past Surgical History:   Procedure Laterality Date    BUNIONECTOMY Left

## 2025-02-13 ENCOUNTER — OFFICE VISIT (OUTPATIENT)
Age: 60
End: 2025-02-13
Payer: MEDICARE

## 2025-02-13 DIAGNOSIS — M47.817 LUMBOSACRAL SPONDYLOSIS WITHOUT MYELOPATHY: Primary | ICD-10-CM

## 2025-02-13 PROCEDURE — G8427 DOCREV CUR MEDS BY ELIG CLIN: HCPCS | Performed by: ANESTHESIOLOGY

## 2025-02-13 PROCEDURE — 3017F COLORECTAL CA SCREEN DOC REV: CPT | Performed by: ANESTHESIOLOGY

## 2025-02-13 PROCEDURE — G8419 CALC BMI OUT NRM PARAM NOF/U: HCPCS | Performed by: ANESTHESIOLOGY

## 2025-02-13 PROCEDURE — 99204 OFFICE O/P NEW MOD 45 MIN: CPT | Performed by: ANESTHESIOLOGY

## 2025-02-13 PROCEDURE — 1036F TOBACCO NON-USER: CPT | Performed by: ANESTHESIOLOGY

## 2025-02-13 RX ORDER — GUANFACINE 2 MG/1
TABLET ORAL
COMMUNITY

## 2025-02-13 RX ORDER — ZIPRASIDONE HYDROCHLORIDE 80 MG/1
80 CAPSULE ORAL 2 TIMES DAILY WITH MEALS
COMMUNITY

## 2025-02-13 RX ORDER — LAMOTRIGINE 100 MG/1
100 TABLET ORAL DAILY
COMMUNITY

## 2025-02-13 RX ORDER — PROPRANOLOL HYDROCHLORIDE 10 MG/1
10 TABLET ORAL 2 TIMES DAILY
COMMUNITY

## 2025-02-26 ENCOUNTER — OFFICE VISIT (OUTPATIENT)
Dept: NEUROSURGERY | Age: 60
End: 2025-02-26

## 2025-02-26 VITALS — WEIGHT: 152 LBS | BODY MASS INDEX: 25.95 KG/M2 | HEIGHT: 64 IN

## 2025-02-26 DIAGNOSIS — G89.29 CHRONIC BILATERAL LOW BACK PAIN WITHOUT SCIATICA: Primary | ICD-10-CM

## 2025-02-26 DIAGNOSIS — M54.50 CHRONIC BILATERAL LOW BACK PAIN WITHOUT SCIATICA: Primary | ICD-10-CM

## 2025-02-26 DIAGNOSIS — M54.59 LUMBAR TRIGGER POINT SYNDROME: ICD-10-CM

## 2025-03-11 ENCOUNTER — TELEPHONE (OUTPATIENT)
Dept: NEUROSURGERY | Age: 60
End: 2025-03-11

## 2025-03-12 NOTE — PROGRESS NOTES
Jessica Montes is a 59 y.o. female who was seen in [facility] on 2/12/2025 . She underwent trigger point injectionsbilaterally at the posterior superior iliac spine 01/29/2025.  And then the 212 visit stated that she was having pain but it had been decreasing over several days.  It was decided not to inject her at that time.  The main complaint today is a return of her pain.     Based on the degree of relief she has received, we will proceed with an additional trigger point injection.    Procedure note:    Iliolumbar trigger point/posterior superior iliac spine injection (20552)    The procedure was explained to the patient in great detail.  All questions were answered.  The patient wishes to proceed with a trigger point junction.    The chart was reviewed and the patient was interviewed to discuss any possible allergies drug interactions or contraindications for the procedure.    ___________________________    The point of maximal tenderness at the bilateral posterior superior iliac spine was identified and marked  The skin was prepped with ChloraPrep  Using sterile technique a skin wheal was made with 1% lidocaine with epinephrine at the area of the skin deena with a 25-gauge needle  The needle was then advanced to identify the point ofmaximal tenderness being able to palpate the tip of the posterior superior iliac spine with a needle.  All around the spike of the bone at the level of the periosteum and in the iliolumbar ligament was infiltrated with 10 cc of 0.5% lidocaine with 1-200,000 epinephrine  With the needle in the same place the syringe was changed and 10 cc of 0.25% Marcaine was infiltrated in a similar fashion.  This was completed with 40 mg of Depo-Medrol.    Procedure was tolerated quite well with no complications.    There was complete relief in the pain from prior to the procedure.  The patient states that there was 100 states she % of total pain relief.    Patient was watched for 15

## 2025-03-26 ENCOUNTER — TELEPHONE (OUTPATIENT)
Age: 60
End: 2025-03-26

## 2025-03-26 ENCOUNTER — OFFICE VISIT (OUTPATIENT)
Dept: NEUROSURGERY | Age: 60
End: 2025-03-26
Payer: MEDICARE

## 2025-03-26 VITALS
BODY MASS INDEX: 26.09 KG/M2 | DIASTOLIC BLOOD PRESSURE: 62 MMHG | HEIGHT: 64 IN | SYSTOLIC BLOOD PRESSURE: 107 MMHG | OXYGEN SATURATION: 94 % | TEMPERATURE: 97.3 F | HEART RATE: 63 BPM

## 2025-03-26 DIAGNOSIS — G89.29 CHRONIC BILATERAL LOW BACK PAIN WITHOUT SCIATICA: ICD-10-CM

## 2025-03-26 DIAGNOSIS — M54.50 CHRONIC BILATERAL LOW BACK PAIN WITHOUT SCIATICA: ICD-10-CM

## 2025-03-26 DIAGNOSIS — M79.18 MYOFASCIAL PAIN: Primary | ICD-10-CM

## 2025-03-26 DIAGNOSIS — M54.59 LUMBAR TRIGGER POINT SYNDROME: ICD-10-CM

## 2025-03-26 PROCEDURE — 20552 NJX 1/MLT TRIGGER POINT 1/2: CPT | Performed by: NEUROLOGICAL SURGERY

## 2025-03-26 NOTE — TELEPHONE ENCOUNTER
Lvm to r/s procedure   Need for prophylactic measure Need for prophylactic measure Need for prophylactic measure Need for prophylactic measure

## 2025-04-08 PROBLEM — M79.18 MYOFASCIAL PAIN: Status: ACTIVE | Noted: 2025-04-08

## 2025-04-08 NOTE — PROGRESS NOTES
10 cc of 0.5% lidocaine with 1-200,000 epinephrine  With the needle in the same place the syringe was changed and 10 cc of 0.25% Marcaine was infiltrated in a similar fashion.  This was completed with 40 mg of Depo-Medrol.    Procedure was tolerated quite well with no complications.    There was complete relief in the pain from prior to the procedure.  The patient states that there was 100 % of total pain relief.    Patient was watched for 15 minutes for any delayed adverse reaction prior to leaving the office.    Nola Huynh MD [facility]

## 2025-04-09 ENCOUNTER — OFFICE VISIT (OUTPATIENT)
Dept: NEUROSURGERY | Age: 60
End: 2025-04-09
Payer: MEDICARE

## 2025-04-09 DIAGNOSIS — M25.552 PAIN OF LEFT HIP: Primary | ICD-10-CM

## 2025-04-09 PROBLEM — M54.59 LUMBAR TRIGGER POINT SYNDROME: Status: RESOLVED | Noted: 2024-07-24 | Resolved: 2025-04-09

## 2025-04-09 PROCEDURE — 1036F TOBACCO NON-USER: CPT | Performed by: NEUROLOGICAL SURGERY

## 2025-04-09 PROCEDURE — 3017F COLORECTAL CA SCREEN DOC REV: CPT | Performed by: NEUROLOGICAL SURGERY

## 2025-04-09 PROCEDURE — 99214 OFFICE O/P EST MOD 30 MIN: CPT | Performed by: NEUROLOGICAL SURGERY

## 2025-04-09 PROCEDURE — G8427 DOCREV CUR MEDS BY ELIG CLIN: HCPCS | Performed by: NEUROLOGICAL SURGERY

## 2025-04-09 PROCEDURE — G8419 CALC BMI OUT NRM PARAM NOF/U: HCPCS | Performed by: NEUROLOGICAL SURGERY

## 2025-04-09 RX ORDER — METHYLPREDNISOLONE 4 MG/1
TABLET ORAL
Qty: 1 KIT | Refills: 0 | Status: SHIPPED | OUTPATIENT
Start: 2025-04-09 | End: 2025-04-15
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
## 2025-04-11 DIAGNOSIS — M47.817 LUMBOSACRAL SPONDYLOSIS WITHOUT MYELOPATHY: Primary | ICD-10-CM

## 2025-04-18 NOTE — PROGRESS NOTES
Procedure Date: 2025      Location: [facility]       Procedure: BILATERAL L4-SA MBB #1       Time Out performed prior to start of the procedure:       Boo White MD performed the following reviews on Jessica Montes 1965 prior to the start of the procedure:       patient was identified by name and     agreement on procedure being performed was verified   risks and benefits explained to patient by the provider  procedure site verified as Bilateral  patient was positioned for comfort   consent signed and verified for procedure       Time:  8:36 AM        Procedure performed by:   Boo White MD      Patient assisted by:   KADE GOSS

## 2025-04-21 ENCOUNTER — OFFICE VISIT (OUTPATIENT)
Age: 60
End: 2025-04-21
Payer: MEDICARE

## 2025-04-21 DIAGNOSIS — M47.817 LUMBOSACRAL SPONDYLOSIS WITHOUT MYELOPATHY: Primary | ICD-10-CM

## 2025-04-21 PROCEDURE — 20610 DRAIN/INJ JOINT/BURSA W/O US: CPT | Performed by: ANESTHESIOLOGY

## 2025-04-21 NOTE — PROGRESS NOTES
[place]     NAME: Jessica Montes     ID:[id number]    :1965     DOS:2025      Location: [id number]    Procedure: Left hip Intra-articular Injection using Fluoroscopic Guidance     Pre-op Diagnosis:  Hip Pain    Post-op Diagnosis: Same     Anesthesia: Local only     Complications: None     Radiation Exposure: Minimal    Indication: As above    Procedure note:    Following the providing of a verbal description of the intended procedure, its risks, benefits, and alternatives, the answering of all patient initiated questions, and the obtaining of written documentation of informed consent, the patient was placed on the fluoroscopy table in the supine position. The patient's left groin/hip area was prepped with chlorhexidine and draped in usual sterile fashion. Sterile technique was then maintained throughout the duration of the procedure.  With the aid of the fluoroscope the left hip was identified, the skin and subcutaneous tissue was anesthetized and through the anesthetized tissue was advanced and 22g quinke spinal needle under intermittent fluoroscopic imaging until the tip of the needle lay at the inferior border of the femoral head at the junction with the femoral neck. Following this, a syringe containing a therapeutic mixture of 0.25% bupivacaine and 40 mg Depo-Medrol was attached to the needle and, following negative aspiration, was injected slowly without complication. The needle was then flushed and withdrawn. The skin was cleansed and dried and a band-aid dressing was applied to the site of insertion. The patient tolerated the procedure well and was followed in recovery fashion before being discharged to home in stable condition.  The patient will follow-up with me in approximately 2 to 3 weeks to determine efficacy as well as further treatment regiment.     Boo White MD

## 2025-05-05 ENCOUNTER — OFFICE VISIT (OUTPATIENT)
Age: 60
End: 2025-05-05
Payer: MEDICARE

## 2025-05-05 DIAGNOSIS — M47.817 LUMBOSACRAL SPONDYLOSIS WITHOUT MYELOPATHY: Primary | ICD-10-CM

## 2025-05-05 PROCEDURE — 64493 INJ PARAVERT F JNT L/S 1 LEV: CPT | Performed by: ANESTHESIOLOGY

## 2025-05-05 PROCEDURE — 64494 INJ PARAVERT F JNT L/S 2 LEV: CPT | Performed by: ANESTHESIOLOGY

## 2025-05-05 NOTE — PROGRESS NOTES
Procedure Date: May 5, 2025      Location: [facility]       Procedure: LUMBAR MBB BILAT 4-SA       Time Out performed prior to start of the procedure:       Boo White MD performed the following reviews on Jessica Montes 1965 prior to the start of the procedure:       patient was identified by name and     agreement on procedure being performed was verified   risks and benefits explained to patient by the provider  procedure site verified as Bilateral  patient was positioned for comfort   consent signed and verified for procedure       Time:  10:05 AM        Procedure performed by:   Boo White MD      Patient assisted by:   KADE GOSS   
using intermittent fluoroscopic guidance, the spinal needle needle was advanced toward the junction of the transverse process and superior articulating process, favoring the cephalad aspect of this junction. Satisfactory needle position was confirmed on AP, lateral, and oblique visualizations. 0.5 ml of 0.5% bupivacaine was then injected.    Next, an oblique view of the left L4 transverse process was then utilized to identify the junction of the junction of the transverse process and superior articulating process. The skin overlying this area was anesthetized with  0.2 ml of 1% lidocaine using a 25 G 1.5 inch needle. Next, using intermittent fluoroscopic guidance, the spinal needle needle was advanced toward the junction of the transverse process and superior articulating process, favoring the cephalad aspect of this junction. Satisfactory needle position was confirmed on AP, lateral, and oblique visualizations. 0.5 ml of 0.5% bupivacaine was then injected.    The needles were withdrawn and Band-Aids were applied. The patient was transported to the recovery room and monitored for an appropriate amount of time, and after meeting discharge criteria, was discharged home with a . No complications were noted through the procedure or recovery period.    Plan: The patient will complete a pain journal. They will follow up in clinic. If the patient receives at least 70%  pain reduction from today's procedure we can discuss pursuing a radiofrequency ablation of these nerves.

## 2025-05-21 ENCOUNTER — OFFICE VISIT (OUTPATIENT)
Age: 60
End: 2025-05-21
Payer: MEDICARE

## 2025-05-21 DIAGNOSIS — M54.16 LUMBAR RADICULOPATHY: Primary | ICD-10-CM

## 2025-05-21 PROCEDURE — 99213 OFFICE O/P EST LOW 20 MIN: CPT | Performed by: ANESTHESIOLOGY

## 2025-05-21 PROCEDURE — 3017F COLORECTAL CA SCREEN DOC REV: CPT | Performed by: ANESTHESIOLOGY

## 2025-05-21 PROCEDURE — 1036F TOBACCO NON-USER: CPT | Performed by: ANESTHESIOLOGY

## 2025-05-21 PROCEDURE — G8419 CALC BMI OUT NRM PARAM NOF/U: HCPCS | Performed by: ANESTHESIOLOGY

## 2025-05-21 PROCEDURE — G8427 DOCREV CUR MEDS BY ELIG CLIN: HCPCS | Performed by: ANESTHESIOLOGY

## 2025-05-21 NOTE — PROGRESS NOTES
Anxiety, Other (See Comments), Nausea And Vomiting and Palpitations           Outpatient Encounter Medications as of 5/21/2025   Medication Sig Dispense Refill    lamoTRIgine (LAMICTAL) 100 MG tablet Take 1 tablet by mouth daily      guanFACINE HCl 2 MG TABS Take by mouth      ziprasidone (GEODON) 80 MG capsule Take 1 capsule by mouth 2 times daily (with meals)      propranolol (INDERAL) 10 MG tablet Take 1 tablet by mouth 2 times daily      acetaminophen (TYLENOL) 500 MG tablet acetaminophen 500 mg      famotidine (PEPCID) 40 MG tablet Take 1 tablet by mouth daily (Patient not taking: Reported on 2/13/2025)      guanFACINE (INTUNIV) 2 MG TB24 extended release tablet Take by mouth 2 times daily      ibuprofen (ADVIL;MOTRIN) 800 MG tablet Take 1 tablet by mouth every 8 hours as needed (Patient not taking: Reported on 2/13/2025)      mirabegron (MYRBETRIQ) 50 MG TB24 Take 50 mg by mouth daily (Patient not taking: Reported on 2/13/2025)      naloxone 4 MG/0.1ML LIQD nasal spray 1 spray (Patient not taking: Reported on 2/13/2025)      ondansetron (ZOFRAN) 8 MG tablet Take 0.5-1 tablets by mouth every 8 hours as needed (Patient not taking: Reported on 2/13/2025)      oxyCODONE-acetaminophen (PERCOCET) 7.5-325 MG per tablet Take 1 tablet by mouth every 6 hours as needed. (Patient not taking: Reported on 2/13/2025)      amphetamine-dextroamphetamine (ADDERALL) 30 MG tablet Take 1 tablet by mouth 2 times daily. (Patient not taking: Reported on 2/13/2025)      baclofen (LIORESAL) 10 MG tablet Take 1 tablet by mouth 2 times daily (Patient not taking: Reported on 2/13/2025)      buPROPion (WELLBUTRIN SR) 150 MG extended release tablet Take 1 tablet by mouth (Patient not taking: Reported on 2/13/2025)      buPROPion (WELLBUTRIN XL) 300 MG extended release tablet Take 1 tablet by mouth every morning (Patient not taking: Reported on 2/13/2025)      celecoxib (CELEBREX) 100 MG capsule Take 1 capsule by mouth 2 times daily (Patient

## 2025-06-10 ENCOUNTER — OFFICE VISIT (OUTPATIENT)
Age: 60
End: 2025-06-10
Payer: MEDICARE

## 2025-06-10 DIAGNOSIS — M47.817 LUMBOSACRAL SPONDYLOSIS WITHOUT MYELOPATHY: ICD-10-CM

## 2025-06-10 DIAGNOSIS — M54.16 LUMBAR RADICULOPATHY: Primary | ICD-10-CM

## 2025-06-10 PROCEDURE — 64484 NJX AA&/STRD TFRM EPI L/S EA: CPT | Performed by: ANESTHESIOLOGY

## 2025-06-10 PROCEDURE — 64483 NJX AA&/STRD TFRM EPI L/S 1: CPT | Performed by: ANESTHESIOLOGY

## 2025-06-10 RX ORDER — DEXAMETHASONE SODIUM PHOSPHATE 10 MG/ML
10 INJECTION, SOLUTION INTRA-ARTICULAR; INTRALESIONAL; INTRAMUSCULAR; INTRAVENOUS; SOFT TISSUE ONCE
Status: COMPLETED | OUTPATIENT
Start: 2025-06-10 | End: 2025-06-10

## 2025-06-10 NOTE — PROGRESS NOTES
Location: [facility]       Procedure: LEFT L4/5  TF MARLEEN       Time Out performed prior to start of the procedure:       Boo White MD performed the following reviews on Jessica Montes 1965 prior to the start of the procedure:       patient was identified by name and     agreement on procedure being performed was verified   risks and benefits explained to patient by the provider  procedure site verified as Left  patient was positioned for comfort   consent signed and verified for procedure             Procedure performed by:   Boo White MD      Patient assisted by:   PENNIE ROCHE   
containing dexamethasone 10 mg, 1ml of 1% lidocaine, and 3ml of normal saline was injected.  Procedure was repeated additional level using same technique and medications discussed    The needle was withdrawn and Band-Aids were applied. The patient was transported to the recovery room and monitored for an appropriate amount of time, and after meeting discharge criteria, was discharged home with a . No complications were noted through the procedure or recovery period.

## 2025-07-14 ENCOUNTER — OFFICE VISIT (OUTPATIENT)
Age: 60
End: 2025-07-14
Payer: MEDICARE

## 2025-07-14 ENCOUNTER — HOSPITAL ENCOUNTER (OUTPATIENT)
Dept: GENERAL RADIOLOGY | Age: 60
Discharge: HOME | End: 2025-07-17
Payer: MEDICARE

## 2025-07-14 DIAGNOSIS — M25.561 RIGHT KNEE PAIN, UNSPECIFIED CHRONICITY: ICD-10-CM

## 2025-07-14 DIAGNOSIS — M25.561 RIGHT KNEE PAIN, UNSPECIFIED CHRONICITY: Primary | ICD-10-CM

## 2025-07-14 PROCEDURE — 99213 OFFICE O/P EST LOW 20 MIN: CPT | Performed by: ANESTHESIOLOGY

## 2025-07-14 PROCEDURE — 73562 X-RAY EXAM OF KNEE 3: CPT

## 2025-07-14 PROCEDURE — 3017F COLORECTAL CA SCREEN DOC REV: CPT | Performed by: ANESTHESIOLOGY

## 2025-07-14 PROCEDURE — 1036F TOBACCO NON-USER: CPT | Performed by: ANESTHESIOLOGY

## 2025-07-14 PROCEDURE — G8428 CUR MEDS NOT DOCUMENT: HCPCS | Performed by: ANESTHESIOLOGY

## 2025-07-14 PROCEDURE — G8419 CALC BMI OUT NRM PARAM NOF/U: HCPCS | Performed by: ANESTHESIOLOGY

## 2025-07-14 NOTE — PROGRESS NOTES
Chronic Pain Consult Note      Plan:     A comprehensive pain management plan may consist of the following: Testing, Therapy, Medications, Interventions, Consults, and Follow up.    Right knee pain  Obtain right knee plain film  Return for right knee intra-articular steroid injection  Posterior superior iliac spine trigger point  Status post TPI with Dr. Huynh x 2.  Patient informs me that she was requested to follow-up as needed with neurosurgery as there are no further recommendations at this time  Lumbar radiculopathy  Status post left L5-S1 and L4 5T MARLEEN with resolution of hip pain  Lumbosacral spondylosis without myelopathy  Schedule #2 bilateral L4 through SA MBB.  Depending on response RFA will be considered      General Recommendations: The pain condition that the patient suffers from is best treated with a multidisciplinary approach that involves an increase in physical activity to prevent de-conditioning and worsening of the pain cycle, as well as psychological counseling (formal and/or informal) to address the co morbid psychological effects of pain. Treatment will often involve judicious use of pain medications and interventional procedures to decrease the pain, allowing the patient to participate in the physical activity that will ultimately produce long-lasting pain reductions. The goal of the multidisciplinary approach is to return the patient to a higher level of overall function and to restore their ability to perform activities of daily living.      Referring Provider: No ref. provider found  Assessment:      Chief Complaint: Follow Up After Procedure      Jessica Montes is a 60 y.o. female [facility] the [facility]:    Diagnosis:  No diagnosis found.      Subjective:      HPI:  Ms. Montes is seen in consultation at the request of No ref. provider found for evaluation and recommendations regarding the above diagnoses and the below HPI.    HPI

## 2025-08-13 ENCOUNTER — OFFICE VISIT (OUTPATIENT)
Age: 60
End: 2025-08-13

## 2025-08-13 DIAGNOSIS — M47.817 LUMBOSACRAL SPONDYLOSIS WITHOUT MYELOPATHY: ICD-10-CM

## 2025-08-13 DIAGNOSIS — M54.16 LUMBAR RADICULOPATHY: Primary | ICD-10-CM

## 2025-08-14 ENCOUNTER — PATIENT MESSAGE (OUTPATIENT)
Age: 60
End: 2025-08-14

## 2025-08-14 DIAGNOSIS — M47.817 LUMBOSACRAL SPONDYLOSIS WITHOUT MYELOPATHY: Primary | ICD-10-CM

## (undated) DEVICE — FOOT & ANKLE SOFT DR WOMACK: Brand: MEDLINE INDUSTRIES, INC.

## (undated) DEVICE — PRECISION THIN, OFFSET (5.5 X 0.38 X 25.0MM)

## (undated) DEVICE — BONE TAMP KIT KPX203PB FF X2 20/3 1 STP: Brand: KYPHOPAK™ TRAY

## (undated) DEVICE — WIRE ORTH 1.1MM DIA 229MM SMOOTH DBL BAYNT TIP S STL K
Type: IMPLANTABLE DEVICE | Site: FOOT | Status: NON-FUNCTIONAL
Removed: 2021-09-23

## (undated) DEVICE — DRSG GZ OIL EMUL CURAD 3X8 --

## (undated) DEVICE — INTENDED FOR TISSUE SEPARATION, AND OTHER PROCEDURES THAT REQUIRE A SHARP SURGICAL BLADE TO PUNCTURE OR CUT.: Brand: BARD-PARKER ® STAINLESS STEEL BLADES

## (undated) DEVICE — BNDG ELAS COBAN 2INX5YD NS --

## (undated) DEVICE — DRESSING N ADH 8X3 IN ADPTC 2015

## (undated) DEVICE — CARDINAL HEALTH FLEXIBLE LIGHT HANDLE COVER: Brand: CARDINAL HEALTH

## (undated) DEVICE — DRILL BIT: Brand: MICA

## (undated) DEVICE — PREP SKN CHLRAPRP APL 26ML STR --

## (undated) DEVICE — SUTURE MCRYL SZ 3-0 L27IN ABSRB UD L19MM PS-2 3/8 CIR PRIM Y427H

## (undated) DEVICE — SUT ETHLN 3-0 18IN PS1 BLK --

## (undated) DEVICE — AMD ANTIMICROBIAL GAUZE SPONGES,12 PLY USP TYPE VII, 0.2% POLYHEXAMETHYLENE BIGUANIDE HCI (PHMB): Brand: CURITY

## (undated) DEVICE — DRAPE SHT 3 QTR PROXIMA 53X77 --

## (undated) DEVICE — K-WIRE BLUNT/TROCAR
Type: IMPLANTABLE DEVICE | Site: FOOT | Status: NON-FUNCTIONAL
Removed: 2021-06-03

## (undated) DEVICE — HEX DRIVER: Brand: MICA

## (undated) DEVICE — REM POLYHESIVE ADULT PATIENT RETURN ELECTRODE: Brand: VALLEYLAB

## (undated) DEVICE — 3M™ TEGADERM™ TRANSPARENT FILM DRESSING FRAME STYLE, 1624W, 2-3/8 IN X 2-3/4 IN (6 CM X 7 CM), 100/CT 4CT/CASE: Brand: 3M™ TEGADERM™

## (undated) DEVICE — SUTURE VCRL SZ 2-0 L27IN ABSRB UD L26MM CT-2 1/2 CIR J269H

## (undated) DEVICE — GOWN,PREVENTION PLUS,2XL,ST,22/CS: Brand: MEDLINE

## (undated) DEVICE — Device

## (undated) DEVICE — ZIMMER® STERILE DISPOSABLE TOURNIQUET CUFF WITH PLC, DUAL PORT, SINGLE BLADDER, 30 IN. (76 CM)

## (undated) DEVICE — PADDING CAST W2INXL4YD ST COT COHESIVE HND TEARABLE SPEC

## (undated) DEVICE — BUTTON SWITCH PENCIL BLADE ELECTRODE, HOLSTER: Brand: EDGE

## (undated) DEVICE — BONE BIOPSY DEVICE F05A BBD SIZE 3: Brand: MEDTRONIC REUSABLE INSTRUMENTS

## (undated) DEVICE — BANDAGE,GAUZE,CONFORMING,2"X75",STRL,LF: Brand: MEDLINE INDUSTRIES, INC.

## (undated) DEVICE — PAD,NON-ADHERENT,3X8,STERILE,LF,1/PK: Brand: MEDLINE

## (undated) DEVICE — SURGICAL PROCEDURE PACK BASIC ST FRANCIS

## (undated) DEVICE — PADDING CAST W4INXL4YD ST COT COHESIVE HND TEARABLE SPEC

## (undated) DEVICE — BURR: Brand: MICA

## (undated) DEVICE — C-ARM: Brand: UNBRANDED

## (undated) DEVICE — BNDG ELAS ESMARK 4INX12FT LF -- STRL

## (undated) DEVICE — 3M™ IOBAN™ 2 ANTIMICROBIAL INCISE DRAPE 6650EZ: Brand: IOBAN™ 2

## (undated) DEVICE — SHEET, T, LAPAROTOMY, STERILE: Brand: MEDLINE

## (undated) DEVICE — GOWN,BREATHABLE SLV,AURORA,LG,STRL: Brand: MEDLINE

## (undated) DEVICE — SOLUTION IRRIG 1000ML 09% SOD CHL USP PIC PLAS CONTAINER

## (undated) DEVICE — STAPLER EXT SKIN 35 WIDE S STL STPL SQUEEZE HNDL VISISTAT

## (undated) DEVICE — SOLUTION IV 1000ML 0.9% SOD CHL

## (undated) DEVICE — ZIMMER® STERILE DISPOSABLE TOURNIQUET CUFF WITH PLC, DUAL PORT, SINGLE BLADDER, 18 IN. (46 CM)

## (undated) DEVICE — DRAPE C ARM W54XL84IN MINI FOR OEC 6800

## (undated) DEVICE — 3M™ TEGADERM™ TRANSPARENT FILM DRESSING FRAME STYLE, 1626W, 4 IN X 4-3/4 IN (10 CM X 12 CM), 50/CT 4CT/CASE: Brand: 3M™ TEGADERM™

## (undated) DEVICE — NEEDLE HYPO 25GA L1.5IN BLU POLYPR HUB S STL REG BVL STR

## (undated) DEVICE — WIRE FIX K 2 TRCR 0.9MMX15.2CM --
Type: IMPLANTABLE DEVICE | Site: FOOT | Status: NON-FUNCTIONAL
Removed: 2021-09-23

## (undated) DEVICE — INTENDED FOR TISSUE SEPARATION, AND OTHER PROCEDURES THAT REQUIRE A SHARP SURGICAL BLADE TO PUNCTURE OR CUT.: Brand: BARD-PARKER SAFETY BLADES SIZE 15, STERILE